# Patient Record
Sex: MALE | Race: WHITE | NOT HISPANIC OR LATINO | Employment: OTHER | URBAN - METROPOLITAN AREA
[De-identification: names, ages, dates, MRNs, and addresses within clinical notes are randomized per-mention and may not be internally consistent; named-entity substitution may affect disease eponyms.]

---
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
## 2018-01-08 ENCOUNTER — OFFICE VISIT - RIVER FALLS (OUTPATIENT)
Dept: FAMILY MEDICINE | Facility: CLINIC | Age: 18
End: 2018-01-08

## 2018-01-08 ASSESSMENT — MIFFLIN-ST. JEOR: SCORE: 2410.01

## 2018-10-28 ENCOUNTER — OFFICE VISIT - RIVER FALLS (OUTPATIENT)
Dept: FAMILY MEDICINE | Facility: CLINIC | Age: 18
End: 2018-10-28

## 2018-10-28 ASSESSMENT — MIFFLIN-ST. JEOR: SCORE: 2373.72

## 2019-05-21 ENCOUNTER — OFFICE VISIT - RIVER FALLS (OUTPATIENT)
Dept: FAMILY MEDICINE | Facility: CLINIC | Age: 19
End: 2019-05-21

## 2019-05-21 ASSESSMENT — MIFFLIN-ST. JEOR: SCORE: 2334.71

## 2020-06-23 ENCOUNTER — OFFICE VISIT - RIVER FALLS (OUTPATIENT)
Dept: FAMILY MEDICINE | Facility: CLINIC | Age: 20
End: 2020-06-23

## 2020-06-29 ENCOUNTER — COMMUNICATION - RIVER FALLS (OUTPATIENT)
Dept: FAMILY MEDICINE | Facility: CLINIC | Age: 20
End: 2020-06-29

## 2020-07-16 ENCOUNTER — OFFICE VISIT - RIVER FALLS (OUTPATIENT)
Dept: FAMILY MEDICINE | Facility: CLINIC | Age: 20
End: 2020-07-16

## 2020-07-17 ENCOUNTER — AMBULATORY - RIVER FALLS (OUTPATIENT)
Dept: FAMILY MEDICINE | Facility: CLINIC | Age: 20
End: 2020-07-17

## 2020-07-31 LAB — SARS-COV-2 RNA SPEC QL NAA+PROBE: NOT DETECTED

## 2020-10-30 ENCOUNTER — AMBULATORY - RIVER FALLS (OUTPATIENT)
Dept: FAMILY MEDICINE | Facility: CLINIC | Age: 20
End: 2020-10-30

## 2020-10-30 ENCOUNTER — OFFICE VISIT - RIVER FALLS (OUTPATIENT)
Dept: FAMILY MEDICINE | Facility: CLINIC | Age: 20
End: 2020-10-30

## 2020-10-30 ENCOUNTER — COMMUNICATION - RIVER FALLS (OUTPATIENT)
Dept: FAMILY MEDICINE | Facility: CLINIC | Age: 20
End: 2020-10-30

## 2021-01-13 ENCOUNTER — OFFICE VISIT - RIVER FALLS (OUTPATIENT)
Dept: FAMILY MEDICINE | Facility: CLINIC | Age: 21
End: 2021-01-13

## 2021-02-03 ENCOUNTER — AMBULATORY - RIVER FALLS (OUTPATIENT)
Dept: FAMILY MEDICINE | Facility: CLINIC | Age: 21
End: 2021-02-03

## 2021-02-04 ENCOUNTER — COMMUNICATION - RIVER FALLS (OUTPATIENT)
Dept: FAMILY MEDICINE | Facility: CLINIC | Age: 21
End: 2021-02-04

## 2021-02-04 LAB
Lab: NEGATIVE
SARS-COV-2 AB PNL SERPL IA: NEGATIVE

## 2021-02-08 ENCOUNTER — OFFICE VISIT - RIVER FALLS (OUTPATIENT)
Dept: FAMILY MEDICINE | Facility: CLINIC | Age: 21
End: 2021-02-08

## 2021-02-08 ASSESSMENT — MIFFLIN-ST. JEOR: SCORE: 1592.63

## 2021-04-28 ENCOUNTER — OFFICE VISIT - RIVER FALLS (OUTPATIENT)
Dept: FAMILY MEDICINE | Facility: CLINIC | Age: 21
End: 2021-04-28

## 2021-04-28 ASSESSMENT — MIFFLIN-ST. JEOR: SCORE: 2363.74

## 2021-12-23 ENCOUNTER — OFFICE VISIT - RIVER FALLS (OUTPATIENT)
Dept: FAMILY MEDICINE | Facility: CLINIC | Age: 21
End: 2021-12-23

## 2021-12-23 ASSESSMENT — MIFFLIN-ST. JEOR: SCORE: 2408.65

## 2022-01-13 ENCOUNTER — OFFICE VISIT - RIVER FALLS (OUTPATIENT)
Dept: FAMILY MEDICINE | Facility: CLINIC | Age: 22
End: 2022-01-13

## 2022-02-12 VITALS
BODY MASS INDEX: 49.35 KG/M2 | DIASTOLIC BLOOD PRESSURE: 72 MMHG | HEIGHT: 67 IN | SYSTOLIC BLOOD PRESSURE: 114 MMHG | WEIGHT: 314.4 LBS | HEART RATE: 64 BPM

## 2022-02-12 VITALS
DIASTOLIC BLOOD PRESSURE: 82 MMHG | SYSTOLIC BLOOD PRESSURE: 122 MMHG | BODY MASS INDEX: 48.65 KG/M2 | HEART RATE: 84 BPM | HEIGHT: 67 IN | WEIGHT: 310 LBS

## 2022-02-12 VITALS
HEIGHT: 67 IN | TEMPERATURE: 98.2 F | BODY MASS INDEX: 21.97 KG/M2 | DIASTOLIC BLOOD PRESSURE: 47 MMHG | WEIGHT: 140 LBS | WEIGHT: 307.8 LBS | DIASTOLIC BLOOD PRESSURE: 68 MMHG | OXYGEN SATURATION: 99 % | SYSTOLIC BLOOD PRESSURE: 91 MMHG | BODY MASS INDEX: 47.85 KG/M2 | HEART RATE: 86 BPM | SYSTOLIC BLOOD PRESSURE: 126 MMHG | HEART RATE: 64 BPM

## 2022-02-12 VITALS
TEMPERATURE: 98.3 F | OXYGEN SATURATION: 97 % | HEIGHT: 67 IN | HEART RATE: 78 BPM | BODY MASS INDEX: 49.44 KG/M2 | SYSTOLIC BLOOD PRESSURE: 122 MMHG | WEIGHT: 315 LBS | DIASTOLIC BLOOD PRESSURE: 76 MMHG

## 2022-02-12 VITALS
BODY MASS INDEX: 49.99 KG/M2 | DIASTOLIC BLOOD PRESSURE: 76 MMHG | WEIGHT: 314.4 LBS | SYSTOLIC BLOOD PRESSURE: 110 MMHG | HEART RATE: 80 BPM

## 2022-02-12 VITALS
HEIGHT: 67 IN | HEART RATE: 106 BPM | SYSTOLIC BLOOD PRESSURE: 118 MMHG | OXYGEN SATURATION: 97 % | BODY MASS INDEX: 49.44 KG/M2 | DIASTOLIC BLOOD PRESSURE: 76 MMHG | WEIGHT: 315 LBS

## 2022-02-12 VITALS
HEIGHT: 67 IN | WEIGHT: 312.2 LBS | TEMPERATURE: 97.7 F | SYSTOLIC BLOOD PRESSURE: 116 MMHG | HEART RATE: 90 BPM | OXYGEN SATURATION: 97 % | BODY MASS INDEX: 49 KG/M2 | DIASTOLIC BLOOD PRESSURE: 78 MMHG

## 2022-02-12 VITALS
HEART RATE: 88 BPM | WEIGHT: 312 LBS | DIASTOLIC BLOOD PRESSURE: 78 MMHG | BODY MASS INDEX: 49.44 KG/M2 | SYSTOLIC BLOOD PRESSURE: 128 MMHG | SYSTOLIC BLOOD PRESSURE: 114 MMHG | RESPIRATION RATE: 20 BRPM | HEIGHT: 67 IN | HEIGHT: 67 IN | TEMPERATURE: 98.6 F | HEART RATE: 100 BPM | DIASTOLIC BLOOD PRESSURE: 80 MMHG | WEIGHT: 315 LBS | BODY MASS INDEX: 48.97 KG/M2 | TEMPERATURE: 99.3 F

## 2022-02-12 VITALS
WEIGHT: 303.6 LBS | OXYGEN SATURATION: 98 % | SYSTOLIC BLOOD PRESSURE: 120 MMHG | DIASTOLIC BLOOD PRESSURE: 82 MMHG | BODY MASS INDEX: 47.65 KG/M2 | HEART RATE: 80 BPM | HEIGHT: 67 IN

## 2022-02-15 NOTE — TELEPHONE ENCOUNTER
---------------------  From: Cecilia Combs (Phone Messages Pool ([id number]_[facility]))   Sent: 7/31/2020 2:49:33 PM CDT  Subject: covid results      Phone Message    YELITZA BARTON    Time of Call: 1328    Phone Number: [phone]    Returned call at: 1445    Note: Mom called stating that pt is needing covid results as they have not heard back yet. Mom also wondering what the whole family needs to do being that they all quarantined already for 14 days and they are all sx free.    Called pt at number listed in his chart, and told him that mom called us and let message about getting covid results but told pt we cannot release info to her being pt is over the age of 16.     Notified pt that results are not back yet and that every needs to quarantine until results are back. Patient understood.

## 2022-02-15 NOTE — PROGRESS NOTES
Chief Complaint    c/o left knee pain since last week, getting worse, pain when bending or using stairs  History of Present Illness       Hurt left knee but unsure when. Hurts when bending or using stairs. Fell two weeks ago on elbow and does not remember hurting knee when fell. No pain at rest. Denies pain walking on flat surface.  Review of Systems       No numbness or tingling in legs       No weakness in legs  Physical Exam   Vitals & Measurements    T: 98.3  F (Tympanic)  HR: 78 (Peripheral)  BP: 122/76  SpO2: 97%     HT: 67.25 in  WT: 319.9 lb  BMI: 49.73        General: No acute distress       Musculoskeletal: Normal gait.  Good strength and range of motion lower extremities.  Left knee with negative Lachman's, anterior drawer, varus and valgus stress and Sunil's.  No joint effusion       Skin: No bruising or swelling around the left knee  Assessment/Plan       Left knee pain: Likely patellofemoral pain syndrome.  Treat with Naprosyn with food for 2 weeks.  Discussed muscle strengthening exercises and handout given. Follow up if not improving. Declines physical therapy for now.  Patient Information     Name:DINO ARORA      Address:      [address] [place] [id number]     Sex:Male     YOB: 2000     [phone]     Emergency Contact:DECLINED, UNKNOWN     MRN:[id number]     FIN:[id number]     Location:[facility]     Date of Service:12/23/2021      Primary Care Physician:       Ramirez Cintron MD, [phone]      Attending Physician:       Maurice Garcia MD, [phone]  Problem List/Past Medical History    Ongoing     Acute medial meniscus tear of right knee     Allergic rhinitis     HAN (generalized anxiety disorder)     Insomnia     Migraine     Moderate persistent asthma     Obesity     Vitamin D Deficiency    Historical     No qualifying data  Procedure/Surgical History     Ganglion cyst (05/23/2000)      Comments: excision rt wrist.  Medications     citalopram 20 mg oral tablet, 40 mg= 2 tab(s), Oral, daily, 5 refills    cyclobenzaprine 10 mg oral tablet, See Instructions, 5 refills    fluticasone 50 mcg/inh nasal spray, 2 spray(s), Nasal, daily, 1 refills    ibuprofen 200 mg oral capsule, 400 mg= 2 cap(s), Oral, q6 hrs, PRN    melatonin 3 mg oral tablet, 3 mg= 1 tab(s), Oral, PRN    topiramate 50 mg oral tablet, 100 mg= 2 tab(s), Oral, daily, 5 refills    ZyrTEC 10 mg oral tablet, 10 mg= 1 tab(s), Oral, daily  Allergies    No Known Medication Allergies

## 2022-02-15 NOTE — TELEPHONE ENCOUNTER
---------------------  From: Anjana Carlisle CMA   Sent: 7/17/2020 4:02:54 PM CDT  Subject: curbside testing     Pt had curbside testing done at the clinic today for PUI for COVID-19. 02 Sat = 95. Specimen sent to [facility] lab. Faxed forms to [facility].

## 2022-02-15 NOTE — NURSING NOTE
Comprehensive Intake Entered On:  6/23/2020 2:51 PM CDT    Performed On:  6/23/2020 2:44 PM CDT by Mary Lou Graves CMA               Summary   Chief Complaint :   Video visit left calf pain x on and off the past month. Verbal consent for video visit.   Menstrual Status :   N/A   Pain Present :   Yes actual or suspected pain   Intensity :   5    Primary Pain Comments :   Keeps him up every night the last week.   Primary Pain Location :   Calf   Mary Lou Graves CMA - 6/23/2020 2:44 PM CDT   Health Status   Allergies Verified? :   Yes   Medication History Verified? :   Yes   Immunizations Current :   Yes   Tobacco Use? :   Never smoker   Mary Lou Graves CMA - 6/23/2020 2:44 PM CDT   Consents   Consent for Immunization Exchange :   Consent Granted   Consent for Immunizations to Providers :   Consent Granted   Mary Lou Graves CMA - 6/23/2020 2:44 PM CDT   Meds / Allergies   (As Of: 6/23/2020 2:51:05 PM CDT)   Allergies (Active)   No Known Medication Allergies  Estimated Onset Date:   Unspecified ; Created By:   Kiko Layne CMA; Reaction Status:   Active ; Category:   Drug ; Substance:   No Known Medication Allergies ; Type:   Allergy ; Updated By:   Kiko Layne CMA; Reviewed Date:   6/23/2020 2:48 PM CDT        Medication List   (As Of: 6/23/2020 2:51:05 PM CDT)   Prescription/Discharge Order    citalopram  :   citalopram ; Status:   Prescribed ; Ordered As Mnemonic:   citalopram 20 mg oral tablet ; Simple Display Line:   40 mg, 2 tab(s), po, daily, 60 tab(s), 5 Refill(s) ; Ordering Provider:   Ramirez Cintron MD; Catalog Code:   citalopram ; Order Dt/Tm:   1/8/2018 2:58:22 PM CST          cyclobenzaprine  :   cyclobenzaprine ; Status:   Prescribed ; Ordered As Mnemonic:   cyclobenzaprine 10 mg oral tablet ; Simple Display Line:   See Instructions, TAKE ONE TABLET BY MOUTH EVERY 6 HOURS AS NEEDED FOR SPASM, 30 tab(s), 5 Refill(s) ; Ordering Provider:   Ramirez Cintron MD; Catalog Code:   cyclobenzaprine ;  Order Dt/Tm:   6/27/2019 12:57:21 PM CDT          fluticasone nasal  :   fluticasone nasal ; Status:   Prescribed ; Ordered As Mnemonic:   fluticasone 50 mcg/inh nasal spray ; Simple Display Line:   2 spray(s), Nasal, daily, 16 gm, 5 Refill(s) ; Ordering Provider:   Ramirez Cintron MD; Catalog Code:   fluticasone nasal ; Order Dt/Tm:   5/21/2019 10:23:14 AM CDT          topiramate  :   topiramate ; Status:   Prescribed ; Ordered As Mnemonic:   topiramate 50 mg oral tablet ; Simple Display Line:   100 mg, 2 tab(s), po, daily, 60 tab(s), 5 Refill(s) ; Ordering Provider:   Ramirez Cintron MD; Catalog Code:   topiramate ; Order Dt/Tm:   1/8/2018 2:58:36 PM [facility]            Home Meds    cholecalciferol  :   cholecalciferol ; Status:   Documented ; Ordered As Mnemonic:   Vitamin D3 2000 intl units oral capsule ; Simple Display Line:   2,000 International Unit, 1 cap(s), PO, daily ; Catalog Code:   cholecalciferol ; Order Dt/Tm:   7/26/2012 1:23:06 PM CDT          ibuprofen  :   ibuprofen ; Status:   Documented ; Ordered As Mnemonic:   ibuprofen 200 mg oral capsule ; Simple Display Line:   400 mg, 2 cap(s), Oral, q6 hrs, PRN: as needed for pain, 0 Refill(s) ; Catalog Code:   ibuprofen ; Order Dt/Tm:   6/23/2020 2:49:29 PM CDT          melatonin  :   melatonin ; Status:   Documented ; Ordered As Mnemonic:   melatonin 3 mg oral tablet ; Simple Display Line:   3 mg, 1 tab(s), po, tab(s), PRN: as needed for insomnia ; Catalog Code:   melatonin ; Order Dt/Tm:   5/20/2011 3:01:30 PM CDT            ID Risk Screen   Recent Travel History :   No recent travel   Family Member Travel History :   No recent travel   Other Exposure to Infectious Disease :   Unknown   Mary Lou Graves CMA - 6/23/2020 2:44 PM CDT

## 2022-02-15 NOTE — TELEPHONE ENCOUNTER
---------------------  From: Ayse Powers   Sent: 10/30/2020 3:30:15 PM CDT  Subject: Curbside Testing     Patient was in for curbside testing.   Per [facility]           O2 sat= 98%  Specimen sent to [facility] lab.     Forms faxed to [facility].     Priority# 0

## 2022-02-15 NOTE — NURSING NOTE
Comprehensive Intake Entered On:  4/28/2021 2:40 PM CDT    Performed On:  4/28/2021 2:34 PM CDT by Ted Wayne LPN               Summary   Chief Complaint :   Back Pain - 2 weeks, no known injury   Menstrual Status :   N/A   Weight Measured :   310 lb(Converted to: 310 lb 0 oz, 140.614 kg)    Height Measured :   67.25 in(Converted to: 5 ft 7 in, 170.81 cm)    Body Mass Index :   48.19 kg/m2 (HI)    Body Surface Area :   2.58 m2   Systolic Blood Pressure :   122 mmHg   Diastolic Blood Pressure :   82 mmHg (HI)    Mean Arterial Pressure :   95 mmHg   Peripheral Pulse Rate :   84 bpm   BP Site :   Right arm   Pulse Site :   Radial artery   BP Method :   Manual   HR Method :   Manual   Ted Wayne LPN - 4/28/2021 2:34 PM CDT   Consents   Consent for Immunization Exchange :   Consent Granted   Consent for Immunizations to Providers :   Consent Granted   Ted Wayne LPN - 4/28/2021 2:34 PM CDT   Meds / Allergies   (As Of: 4/28/2021 2:40:51 PM CDT)   Allergies (Active)   No Known Medication Allergies  Estimated Onset Date:   Unspecified ; Created By:   Kiko Layne CMA; Reaction Status:   Active ; Category:   Drug ; Substance:   No Known Medication Allergies ; Type:   Allergy ; Updated By:   Kiko Layne CMA; Reviewed Date:   6/23/2020 2:48 PM CDT        Medication List   (As Of: 4/28/2021 2:40:51 PM CDT)   Prescription/Discharge Order    albuterol  :   albuterol ; Status:   Prescribed ; Ordered As Mnemonic:   albuterol 90 mcg/inh inhalation aerosol ; Simple Display Line:   2 puff(s), Inhale, q4-6 hrs, PRN: for cough, 1 EA, 1 Refill(s) ; Ordering Provider:   Zachary Lunsford MD; Catalog Code:   albuterol ; Order Dt/Tm:   2/8/2021 2:33:05 PM CST          citalopram  :   citalopram ; Status:   Prescribed ; Ordered As Mnemonic:   citalopram 20 mg oral tablet ; Simple Display Line:   40 mg, 2 tab(s), po, daily, 60 tab(s), 5 Refill(s) ; Ordering Provider:   Ramirez Cintron MD; Catalog Code:   citalopram  ; Order Dt/Tm:   1/8/2018 2:58:22 PM CST          cyclobenzaprine  :   cyclobenzaprine ; Status:   Prescribed ; Ordered As Mnemonic:   cyclobenzaprine 10 mg oral tablet ; Simple Display Line:   See Instructions, TAKE ONE TABLET BY MOUTH EVERY 6 HOURS AS NEEDED FOR SPASM, 30 tab(s), 5 Refill(s) ; Ordering Provider:   Ramirez Cintron MD; Catalog Code:   cyclobenzaprine ; Order Dt/Tm:   6/27/2019 12:57:21 PM CDT          fluticasone nasal  :   fluticasone nasal ; Status:   Prescribed ; Ordered As Mnemonic:   fluticasone 50 mcg/inh nasal spray ; Simple Display Line:   2 spray(s), Nasal, daily, 16 gm, 1 Refill(s) ; Ordering Provider:   Ramirez Cintron MD; Catalog Code:   fluticasone nasal ; Order Dt/Tm:   11/24/2020 10:18:41 AM CST          topiramate  :   topiramate ; Status:   Prescribed ; Ordered As Mnemonic:   topiramate 50 mg oral tablet ; Simple Display Line:   100 mg, 2 tab(s), po, daily, 60 tab(s), 5 Refill(s) ; Ordering Provider:   Ramirez Cintron MD; Catalog Code:   topiramate ; Order Dt/Tm:   1/8/2018 2:58:36 PM CST            Home Meds    cholecalciferol  :   cholecalciferol ; Status:   Documented ; Ordered As Mnemonic:   Vitamin D3 2000 intl units oral capsule ; Simple Display Line:   2,000 International Unit, 1 cap(s), PO, daily ; Catalog Code:   cholecalciferol ; Order Dt/Tm:   7/26/2012 1:23:06 PM CDT          ibuprofen  :   ibuprofen ; Status:   Documented ; Ordered As Mnemonic:   ibuprofen 200 mg oral capsule ; Simple Display Line:   400 mg, 2 cap(s), Oral, q6 hrs, PRN: as needed for pain, 0 Refill(s) ; Catalog Code:   ibuprofen ; Order Dt/Tm:   6/23/2020 2:49:29 PM CDT          melatonin  :   melatonin ; Status:   Documented ; Ordered As Mnemonic:   melatonin 3 mg oral tablet ; Simple Display Line:   3 mg, 1 tab(s), po, tab(s), PRN: as needed for insomnia ; Catalog Code:   melatonin ; Order Dt/Tm:   5/20/2011 3:01:30 PM CDT            ID Risk Screen   Recent Travel History :   No recent travel   Family  Member Travel History :   No recent travel   Other Exposure to Infectious Disease :   Unknown   COVID-19 Testing Status :   No positive COVID-19 test   Ted Wayne LPN - 4/28/2021 2:34 PM CDT   Social History   Social History   (As Of: 4/28/2021 2:40:51 PM CDT)   Alcohol:  Denies Alcohol Use      (Last Updated: 4/19/2010 4:03:19 PM CDT by Elizabeth Farah CMA )         Tobacco:        Never (less than 100 in lifetime)   (Last Updated: 1/13/2021 4:10:29 PM CST by Beatris Babin)          Electronic Cigarette/Vaping:        Electronic Cigarette Use: Never.   (Last Updated: 1/13/2021 4:10:33 PM CST by Beatris Babin)          Employment/School:        Student   (Last Updated: 3/21/2012 12:50:20 PM CDT by Kym Camp)          Exercise:  Occasional exercise      (Last Updated: 4/19/2010 4:03:30 PM CDT by Elizabeth Farah CMA )

## 2022-02-15 NOTE — NURSING NOTE
"Comprehensive Intake Entered On:  5/21/2019 10:14 AM CDT    Performed On:  5/21/2019 10:09 AM CDT by Florida Burton CMA               Summary   Chief Complaint :   Pt here for \"clicking\"   right knee and nose discomfort   Menstrual Status :   N/A   Weight Measured :   303.6 lb(Converted to: 303 lb 10 oz, 137.71 kg)    Height Measured :   67.25 in(Converted to: 5 ft 7 in, 170.81 cm)    Body Mass Index :   47.19 kg/m2 (HI)    Body Surface Area :   2.55 m2   Systolic Blood Pressure :   120 mmHg   Diastolic Blood Pressure :   82 mmHg (HI)    Mean Arterial Pressure :   95 mmHg   Peripheral Pulse Rate :   80 bpm   Oxygen Saturation :   98 %   Florida Burton CMA - 5/21/2019 10:09 AM CDT   Health Status   Allergies Verified? :   Yes   Medication History Verified? :   Yes   Immunizations Current :   Yes   Medical History Verified? :   Yes   Pre-Visit Planning Status :   Completed   Tobacco Use? :   Never smoker   Florida Burton CMA - 5/21/2019 10:09 AM CDT   Consents   Consent for Immunization Exchange :   Consent Granted   Consent for Immunizations to Providers :   Consent Granted   Florida Burton CMA - 5/21/2019 10:09 AM CDT   Meds / Allergies   (As Of: 5/21/2019 10:14:19 AM CDT)   Allergies (Active)   No Known Medication Allergies  Estimated Onset Date:   Unspecified ; Created By:   Kiko Layne CMA; Reaction Status:   Active ; Category:   Drug ; Substance:   No Known Medication Allergies ; Type:   Allergy ; Updated By:   Kiko Layne CMA; Reviewed Date:   5/21/2019 10:13 AM CDT        Medication List   (As Of: 5/21/2019 10:14:19 AM CDT)   Prescription/Discharge Order    citalopram  :   citalopram ; Status:   Prescribed ; Ordered As Mnemonic:   citalopram 20 mg oral tablet ; Simple Display Line:   40 mg, 2 tab(s), po, daily, 60 tab(s), 5 Refill(s) ; Ordering Provider:   Ramirez Cintron MD; Catalog Code:   citalopram ; Order Dt/Tm:   1/8/2018 2:58:22 PM          cyclobenzaprine  :   cyclobenzaprine ; " Status:   Prescribed ; Ordered As Mnemonic:   cyclobenzaprine 10 mg oral tablet ; Simple Display Line:   See Instructions, TAKE ONE TABLET BY MOUTH EVERY 6 HOURS AS NEEDED FOR SPASM, 30 tab(s), 5 Refill(s) ; Ordering Provider:   Ramirez Cintron MD; Catalog Code:   cyclobenzaprine ; Order Dt/Tm:   1/8/2018 2:57:43 PM          topiramate  :   topiramate ; Status:   Prescribed ; Ordered As Mnemonic:   topiramate 50 mg oral tablet ; Simple Display Line:   100 mg, 2 tab(s), po, daily, 60 tab(s), 5 Refill(s) ; Ordering Provider:   Ramirez Cintron MD; Catalog Code:   topiramate ; Order Dt/Tm:   1/8/2018 2:58:36 PM            Home Meds    cholecalciferol  :   cholecalciferol ; Status:   Documented ; Ordered As Mnemonic:   Vitamin D3 2000 intl units oral capsule ; Simple Display Line:   2,000 International Unit, 1 cap(s), PO, daily ; Catalog Code:   cholecalciferol ; Order Dt/Tm:   7/26/2012 1:23:06 PM          melatonin  :   melatonin ; Status:   Documented ; Ordered As Mnemonic:   melatonin 3 mg oral tablet ; Simple Display Line:   3 mg, 1 tab(s), po, tab(s), PRN: as needed for insomnia ; Catalog Code:   melatonin ; Order Dt/Tm:   5/20/2011 3:01:30 PM

## 2022-02-15 NOTE — PROGRESS NOTES
Patient:   DINO ARORA            MRN: [id number]            FIN: [id number]               Age:   20 years     Sex:  Male     :  2000   Associated Diagnoses:   Animal bite   Author:   Roland Horne MD      Visit Information      Date of Service: 2021 04:08 pm  Performing Location: [facility]  Encounter#: [id number]      Chief Complaint   2021 4:09 PM CST    Animal bite, laceration to back of right leg.        History of Present Illness   Patient is in with a Pig but [place].  No other complaints worse.         Review of Systems   Constitutional:  Negative except as documented in history of present illness.    Musculoskeletal:  Negative.    Integumentary:  Negative except as documented in history of present illness.    Neurologic:  Negative.       Physical Examination   Vital Signs   2021 4:09 PM CST Temperature Tympanic 98.2 DegF    Peripheral Pulse Rate 86 bpm    HR Method Electronic    Systolic Blood Pressure 91 mmHg    Diastolic Blood Pressure 47 mmHg  LOW    Mean Arterial Pressure 62 mmHg    BP Method Electronic    Vital Signs Comments thigh cuff used      Measurements from flowsheet : Measurements   2021 4:09 PM CST    Weight Measured - Standard                307.8 lb     General:  Alert and oriented, No acute distress.    Integumentary:  Patient has a one 1-1/2 cm slightly open wound in his posterior right leg with a 4 cm scratch below that.  CMS otherwise intact for the leg wound was sterilely cleansed and then dressed with antibiotic ointment and gauze  .    Neurologic:  Alert, Oriented.       Impression and Plan   Diagnosis     Animal bite ([id number] T14.8XXA).     Plan:  Patient with animal bite tetanus status was updated today.  Wound care reviewed.  Given the small open area the wound will look at wound care allowing to heal by secondary granulation follow-up for any signs of infection will cover with  Augmentin  .

## 2022-02-15 NOTE — NURSING NOTE
Comprehensive Intake Entered On:  2/8/2021 2:02 PM CST    Performed On:  2/8/2021 1:58 PM CST by Lisset Nguyen CMA               Summary   Chief Complaint :   c/o chest congestion off and on since Jan.   Menstrual Status :   N/A   Weight Measured :   140.0 lb(Converted to: 140 lb 0 oz, 63.503 kg)    Height Measured :   67.25 in(Converted to: 5 ft 7 in, 170.81 cm)    Body Mass Index :   21.76 kg/m2   Body Surface Area :   1.73 m2   Systolic Blood Pressure :   126 mmHg   Diastolic Blood Pressure :   68 mmHg   Mean Arterial Pressure :   87 mmHg   Peripheral Pulse Rate :   64 bpm   Oxygen Saturation :   99 %   Lisset Nguyen CMA - 2/8/2021 1:58 PM CST   Health Status   Allergies Verified? :   Yes   Medication History Verified? :   Yes   Immunizations Current :   Yes   Pre-Visit Planning Status :   Completed   Lisset Nguyen CMA - 2/8/2021 1:58 PM CST   Consents   Consent for Immunization Exchange :   Consent Granted   Consent for Immunizations to Providers :   Consent Granted   Lisset Nguyen CMA - 2/8/2021 1:58 PM CST   Meds / Allergies   (As Of: 2/8/2021 2:02:40 PM CST)   Allergies (Active)   No Known Medication Allergies  Estimated Onset Date:   Unspecified ; Created By:   Kiko Layne CMA; Reaction Status:   Active ; Category:   Drug ; Substance:   No Known Medication Allergies ; Type:   Allergy ; Updated By:   Kiko Layne CMA; Reviewed Date:   6/23/2020 2:48 PM CDT        Medication List   (As Of: 2/8/2021 2:02:40 PM CST)   Prescription/Discharge Order    citalopram  :   citalopram ; Status:   Prescribed ; Ordered As Mnemonic:   citalopram 20 mg oral tablet ; Simple Display Line:   40 mg, 2 tab(s), po, daily, 60 tab(s), 5 Refill(s) ; Ordering Provider:   Ramirez Cintron MD; Catalog Code:   citalopram ; Order Dt/Tm:   1/8/2018 2:58:22 PM CST          cyclobenzaprine  :   cyclobenzaprine ; Status:   Prescribed ; Ordered As Mnemonic:   cyclobenzaprine 10 mg oral tablet ; Simple  Display Line:   See Instructions, TAKE ONE TABLET BY MOUTH EVERY 6 HOURS AS NEEDED FOR SPASM, 30 tab(s), 5 Refill(s) ; Ordering Provider:   Ramirez Cintron MD; Catalog Code:   cyclobenzaprine ; Order Dt/Tm:   6/27/2019 12:57:21 PM CDT          fluticasone nasal  :   fluticasone nasal ; Status:   Prescribed ; Ordered As Mnemonic:   fluticasone 50 mcg/inh nasal spray ; Simple Display Line:   2 spray(s), Nasal, daily, 16 gm, 1 Refill(s) ; Ordering Provider:   Ramirez Cintron MD; Catalog Code:   fluticasone nasal ; Order Dt/Tm:   11/24/2020 10:18:41 AM CST          topiramate  :   topiramate ; Status:   Prescribed ; Ordered As Mnemonic:   topiramate 50 mg oral tablet ; Simple Display Line:   100 mg, 2 tab(s), po, daily, 60 tab(s), 5 Refill(s) ; Ordering Provider:   Ramirez Cintron MD; Catalog Code:   topiramate ; Order Dt/Tm:   1/8/2018 2:58:36 PM CST            Home Meds    cholecalciferol  :   cholecalciferol ; Status:   Documented ; Ordered As Mnemonic:   Vitamin D3 2000 intl units oral capsule ; Simple Display Line:   2,000 International Unit, 1 cap(s), PO, daily ; Catalog Code:   cholecalciferol ; Order Dt/Tm:   7/26/2012 1:23:06 PM CDT          ibuprofen  :   ibuprofen ; Status:   Documented ; Ordered As Mnemonic:   ibuprofen 200 mg oral capsule ; Simple Display Line:   400 mg, 2 cap(s), Oral, q6 hrs, PRN: as needed for pain, 0 Refill(s) ; Catalog Code:   ibuprofen ; Order Dt/Tm:   6/23/2020 2:49:29 PM CDT          melatonin  :   melatonin ; Status:   Documented ; Ordered As Mnemonic:   melatonin 3 mg oral tablet ; Simple Display Line:   3 mg, 1 tab(s), po, tab(s), PRN: as needed for insomnia ; Catalog Code:   melatonin ; Order Dt/Tm:   5/20/2011 3:01:30 PM CDT            ID Risk Screen   Recent Travel History :   No recent travel   Family Member Travel History :   No recent travel   Other Exposure to Infectious Disease :   Unknown   COVID-19 Testing Status :   No positive COVID-19 test   Patrickmargarita CASTRO, Lisset -  2/8/2021 1:58 PM CST

## 2022-02-15 NOTE — NURSING NOTE
Comprehensive Intake Entered On:  7/16/2020 3:40 PM CDT    Performed On:  7/16/2020 3:38 PM CDT by Beatris Babin               Summary   Chief Complaint :   Concerns for COVID.  Was at a large gathering out of state ([place] 7/6-7/11.  Developed a headache on 7/14 and GI/diarrhea on 7/15.  Verbal consent for video visit given   Menstrual Status :   N/A   Beatris Babin - 7/16/2020 3:38 PM CDT   Health Status   Allergies Verified? :   Yes   Medication History Verified? :   Yes   Immunizations Current :   Yes   Baetris Babin - 7/16/2020 3:38 PM CDT   ID Risk Screen   Recent Travel History :   Last travel within 14 days   Recent Travel Location :   [place]   Family Member Travel History :   No recent travel   Other Exposure to Infectious Disease :   Unknown   Beatris Babin - 7/16/2020 3:38 PM CDT

## 2022-02-15 NOTE — TELEPHONE ENCOUNTER
---------------------  From: Cecilia Combs (Phone [facility] [place] ([id number]_[facility]))   To: [facility] Message Pool ([id number]_[facility])   ;     Sent: 6/30/2020 9:17:15 AM CDT  Subject: MRI Results      Phone Message    PCP: MICK    Time of Call: 1147    Phone Number: [phone]    Note: Naila- mom, called stating that she had an MRI done yesterday. Mom states that pt has been a lot of discomfort and pain over the last week. They are wanting [facility] to look at results and give them a call back so they can have a plan of what to do next. Please advise.     Pharmacy: [facility]     Last office visit and reason: 6/23/2020 Video Encounter     Transferred to: [facility] [place]---------------------  From: Florida Burton CMA ([place] ([id number]_[facility])   )   To: Ramirez Cintron MD;     Sent: 6/30/2020 9:29:53 AM CDT  Subject: FW: MRI Results

## 2022-02-15 NOTE — PROGRESS NOTES
Patient:   DINO ARORA            MRN: [id number]            FIN: [id number]               Age:   16 years     Sex:  Male     :  2000   Associated Diagnoses:   Right elbow pain   Author:   Zachary Lunsford MD      Subjective   Chief complaint 2017 3:36 PM CST    c/o right elbow pain off and on for a few weeks. Unsure of how or when he hurt it.  .     pain is in antecubital fossa and primarily with lifting or more vigourous activity.   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  no specific trauma or fall  no tingling'  no bruising      Health Status   Allergies:    Allergic Reactions (Selected)  No Known Medication Allergies   Medications:  (Selected)   Prescriptions  Prescribed  CeleXA 20 mg oral tablet: 2 tab(s) ( 40 mg ), po, daily, # 60 tab(s), 5 Refill(s), Type: Maintenance, Pharmacy: [id number], 2 tab(s) po daily  Medrol 4 mg oral tablet: 1 packet(s), PO, Once, Instructions: as directed on package labeling, # 21 tab(s), 0 Refill(s), Type: Soft Stop, Pharmacy: [id number], 1 packet(s) po once,Instr:as directed on package labeling  amitriptyline 25 mg oral tablet: 3 tab(s) ( 75 mg ), PO, Once a day (at bedtime), tab(s), 2 Refill(s), Type: Maintenance, Pharmacy: [id number]  amitriptyline 25 mg oral tablet: 3 tab(s) ( 75 mg ), po, hs, # 90 tab(s), 5 Refill(s), Pharmacy: [id number], 3 tab(s) po hs  cyclobenzaprine 10 mg oral tablet: See Instructions, Instructions: TAKE ONE TABLET BY MOUTH EVERY 6 HOURS AS NEEDED FOR SPASM, # 30 tab(s), 1 Refill(s), Type: Soft Stop, Pharmacy: [id number], TAKE ONE TABLET BY MOUTH EVERY 6 HOURS AS NEEDED FOR SPASM  topiramate 50 mg oral tablet: 2 tab(s) ( 100 mg ), po, daily, # 60 tab(s), 5 Refill(s), Type: Maintenance, Pharmacy: [id number], 2 tab(s) po daily  Documented Medications  Documented  Vitamin D3 2000 intl units oral capsule: 1 cap(s) ( 2,000 International Unit ), PO, daily, 0  Refill(s), Type: Soft Stop  melatonin 3 mg oral tablet: 1 tab(s) ( 3 mg ), po, PRN: as needed for insomnia, tab(s), 0 Refill(s), Type: Maintenance   Problem list:    All Problems  Obesity / 278.00 / Confirmed  Insomnia / 561526884 / Confirmed  Vitamin D Deficiency / 268.9 / Confirmed  Moderate persistent asthma / 0324397164 / Confirmed  Migraine / 96184035 / Confirmed      Objective   Vital Signs   2/20/2017 3:36 PM CST Peripheral Pulse Rate 64 bpm    Systolic Blood Pressure 114 mmHg    Diastolic Blood Pressure 72 mmHg    Mean Arterial Pressure 86 mmHg      Measurements from flowsheet : Measurements   2/20/2017 3:36 PM CST Height Measured - Standard 66.5 in    Weight Measured - Standard 314.4 lb    BSA 2.58 m2    Body Mass Index 49.98 kg/m2    Body Mass Index Percentile 99.92      General:  Alert and oriented, No acute distress.    Musculoskeletal:  Normal strength, No swelling, No deformity, tender with biceps activation.    Integumentary:  Warm.    Psychiatric:  Cooperative, Appropriate mood & affect.       Impression and Plan   Assessment and Plan:          Diagnosis: Right elbow pain (VOK17-OC M25.521).         Course: bicips tendonitis  reviewed appropriate activity  discussed stretching and strengthening  gave information about icing and heat  reviewed medication such as NSAIDs,         xray is normal radiology to over read,  discussed with patient.

## 2022-02-15 NOTE — PROGRESS NOTES
Patient:   DINO ARORA            MRN: [id number]            FIN: [id number]               Age:   16 years     Sex:  Male     :  2000   Associated Diagnoses:   HAN (generalized anxiety disorder); Insomnia; Migraine   Author:   Ramirez Cintron MD      Impression and Plan   Diagnosis     HAN (generalized anxiety disorder) (PLL32-XK F41.1).     Course:  Progressing as expected.    Orders     Orders   Charges (Evaluation and Management):  93776 office outpatient visit 25 minutes (Charge) (Order): Quantity: 1, Insomnia  Moderate persistent asthma  Migraine  HAN (generalized anxiety disorder).     Orders (Selected)   Prescriptions  Prescribed  CeleXA 20 mg oral tablet: 2 tab(s) ( 40 mg ), po, daily, # 60 tab(s), 5 Refill(s), Type: Maintenance, Pharmacy: [id number], 2 tab(s) po daily.     Diagnosis     Insomnia (OGO32-BE F51.01).     Course:  Progressing as expected.    Orders     Orders (Selected)   Prescriptions  Prescribed  amitriptyline 25 mg oral tablet: 3 tab(s) ( 75 mg ), po, hs, # 90 tab(s), 5 Refill(s), Pharmacy: [id number], 3 tab(s) po hs  Documented Medications  Documented  melatonin 3 mg oral tablet: 1 tab(s) ( 3 mg ), po, PRN: as needed for insomnia, tab(s), 0 Refill(s), Type: Maintenance.     Diagnosis     Migraine (UCG23-ML G43.009).     Course:  Progressing as expected.    Orders     Orders (Selected)   Prescriptions  Prescribed  topiramate 50 mg oral tablet: 2 tab(s) ( 100 mg ), po, daily, # 60 tab(s), 5 Refill(s), Type: Maintenance, Pharmacy: [id number], 2 tab(s) po daily.        Visit Information   Visit type:  Scheduled follow-up.    Accompanied by:  Mother.    Source of history:  Self, Mother.    Referral source:  Self.    History limitation:  None.       Chief Complaint   3/16/2017 10:39 AM CDT   Pt here for med ck        History of Present Illness             The patient presents with insomnia.  The severity of the insomnia is severe.  The insomnia is  constant.  The insomnia has lasted for several years.  There are no modifying factors.  Associated symptoms consist of fatigue.  Additional pertinent history: none.               The patient presents with anxiety.  The anxiety is characterized by difficulty concentrating, irritability, nervousness and restlessness.  The severity of the anxiety is moderate.  The anxiety is constant.  The context of the anxiety: occurred in association with the inability to cope with stress.  Exacerbating factors consist of emotional stress and social change.  Relieving factors consist of medication and stress reduction.        Interval History   Since the patient's last visit for a migraine:   The patient reports the migraine episodes have decreased.  There has been no effect on the patients daily activities.  Associated symptom(s) consist of sensitivity to light, visual disturbance, nausea, vomiting and no aura.     Review of Systems   Constitutional:  Negative.    Eye:  Negative.    Ear/Nose/Mouth/Throat:  Negative.    Respiratory:  Negative.    Cardiovascular:  Negative.    Gastrointestinal:  Negative.    Genitourinary:  Negative.    Hematology/Lymphatics:  Negative.    Endocrine:  Negative.    Immunologic:  Negative.    Musculoskeletal:  Negative.    Integumentary:  Negative.    Neurologic:  Negative.    Psychiatric:  Negative.    All other systems reviewed and negative      Health Status   Allergies:    Allergic Reactions (Selected)  No Known Medication Allergies   Medications:  (Selected)   Prescriptions  Prescribed  CeleXA 20 mg oral tablet: 2 tab(s) ( 40 mg ), po, daily, # 60 tab(s), 5 Refill(s), Type: Maintenance, Pharmacy: [id number], 2 tab(s) po daily  amitriptyline 25 mg oral tablet: 3 tab(s) ( 75 mg ), PO, Once a day (at bedtime), tab(s), 2 Refill(s), Type: Maintenance, Pharmacy: [id number]  amitriptyline 25 mg oral tablet: 3 tab(s) ( 75 mg ), po, hs, # 90 tab(s), 5 Refill(s), Pharmacy: [facility] #[id number], 3 tab(s) po hs  cyclobenzaprine 10 mg oral tablet: See Instructions, Instructions: TAKE ONE TABLET BY MOUTH EVERY 6 HOURS AS NEEDED FOR SPASM, # 30 tab(s), 2 Refill(s), Type: Soft Stop, Pharmacy: [id number], TAKE ONE TABLET BY MOUTH EVERY 6 HOURS AS NEEDED FOR SPASM  topiramate 50 mg oral tablet: 2 tab(s) ( 100 mg ), po, daily, # 60 tab(s), 5 Refill(s), Type: Maintenance, Pharmacy: [id number], 2 tab(s) po daily  Documented Medications  Documented  Vitamin D3 2000 intl units oral capsule: 1 cap(s) ( 2,000 International Unit ), PO, daily, 0 Refill(s), Type: Soft Stop  melatonin 3 mg oral tablet: 1 tab(s) ( 3 mg ), po, PRN: as needed for insomnia, tab(s), 0 Refill(s), Type: Maintenance   Problem list:    All Problems  HAN (generalized anxiety disorder) / SNOMED CT 74989620 / Confirmed  Insomnia / SNOMED CT 9368873513 / Confirmed  Migraine / SNOMED CT 0194790616 / Confirmed  Moderate persistent asthma / SNOMED CT 9483638961 / Confirmed  Obesity / ICD-9-.00 / Confirmed  Vitamin D Deficiency / ICD-9-.9 / Confirmed      Histories   Past Medical History:    Active  Obesity (278.00)  Insomnia (2118934432)   Family History:    Cancer  Grandmother (M)  Diabetes mellitus  Mother (Naila)  Grandmother (M)  Grandmother (P)  Hypertension  Grandfather (M)  Grandmother (M)  Heart disease  Grandmother (P)  Grandfather (P)     Procedure history:    Ganglion cyst (SNOMED CT 441270824) on 5/23/2000 at 22 Months.  Comments:  4/19/2010 3:59 PM - Farah , Elizabeth  excision rt wrist      Physical Examination   Vital Signs   3/16/2017 10:39 AM CDT Peripheral Pulse Rate 80 bpm    Pulse Site Radial artery    HR Method Manual    Systolic Blood Pressure 110 mmHg    Diastolic Blood Pressure 76 mmHg    Mean Arterial Pressure 87 mmHg    BP Site Right arm    BP Method Manual      Measurements from flowsheet : Measurements   3/16/2017 10:39 AM CDT   Weight Measured - Standard                314.4 lb      General:  No acute distress.    Neck:  Supple, No lymphadenopathy, No thyromegaly.    Respiratory:  Lungs are clear to auscultation, Respirations are non-labored, Breath sounds are equal, Symmetrical chest wall expansion.    Cardiovascular:  Normal rate, Regular rhythm, No murmur, No gallop, Good pulses equal in all extremities, Normal peripheral perfusion, No edema.    Gastrointestinal:  Soft, Non-tender, Non-distended, Normal bowel sounds, No organomegaly.    Integumentary:  Warm, Dry, Pink.    Neurologic:  Alert, Oriented.    Psychiatric:  Cooperative, Appropriate mood & affect, Non-suicidal.         Mood and affect: Calm.         Behavior: Relaxed.         Judgment: Able to make sensible decisions, Appropriate in social situations.         Thought process: Appropriate.

## 2022-02-15 NOTE — PROGRESS NOTES
"Chief Complaint    Pt consent to video vist. Would like to be test for COVID. Was an a trip 10/25 and a few friend have been positive this week. His symptoms are HA, cough, muscle aches.  History of Present Illness      Today's visit was conducted via video due to the COVID-19 pandemic. PT consent to video visit was obtained and documented       Call Start Time:   11:55 am      Call End Time:    11:59 am       Pt reports 6-7 days ago he traveled to [place] for a Pig show.  It was a mass gathering, large crowds.  He recieved an email from the coordinator of the event that there has been several positive individuals with known Positive covid 19 results from that large gathering. Pt denies any known close exposures.   Pt has had about 5 days of rhionrrhea, congestion, HA, fatigue.  He denies any severe sx of sob or difficulty breathing, nausea, vomiting, fevers or chills.  He works at a [employer] office and they are requiring testing for Covid 19 prior to return.   Review of Systems      Review of systems is negative with the exception of those noted in HPI          Physical Exam      nad appears well      able to talk in full sentances, no obvious tachypnea  Assessment/Plan       1. Acute URI (J06.9)         conservative measures.  Push fluids, rest and ibuprofen or tylenol for comfort.  recommend fu for persistent or worsening sx.  Covid 19 testing at drive up.  14 day quarentine from time of event 6 days ago, would be reasonable given it sounds as if it may have been a \"superspreader\" event.       2. Close exposure to 2019 novel coronavirus (Z20.828)  Patient Information     Name:DINO ARORA      Address:      [address] [place] [address] [address] [place] [phone]     Sex:Male     YOB: 2000     [phone]     Emergency Contact:DECLINED, UNKNOWN     MRN:[id number]     FIN:[id number]     Location:[facility]     Date of Service:10/30/2020      Primary Care Physician:       Abdulaziz LUCIANO, Ramirez, " [phone]      Attending Physician:       Thaddeus TRUJILLO, Vanessa JOAQUIN, [phone]  Problem List/Past Medical History    Ongoing     Acute medial meniscus tear of right knee     Allergic rhinitis     AHN (generalized anxiety disorder)     Insomnia     Migraine     Moderate persistent asthma     Obesity     Vitamin D Deficiency    Historical     No qualifying data  Procedure/Surgical History     Ganglion cyst (05/23/2000)            Comments: excision rt wrist.  Medications    citalopram 20 mg oral tablet, 40 mg= 2 tab(s), Oral, daily, 5 refills    cyclobenzaprine 10 mg oral tablet, See Instructions, 5 refills    fluticasone 50 mcg/inh nasal spray, 2 spray(s), Nasal, daily, 2 refills    ibuprofen 200 mg oral capsule, 400 mg= 2 cap(s), Oral, q6 hrs, PRN    melatonin 3 mg oral tablet, 3 mg= 1 tab(s), Oral, PRN    topiramate 50 mg oral tablet, 100 mg= 2 tab(s), Oral, daily, 5 refills    Vitamin D3 2000 intl units oral capsule, 2000 International Unit= 1 cap(s), Oral, daily  Allergies    No Known Medication Allergies  Social History    Smoking Status     Never smoker     Alcohol - Denies Alcohol Use     Employment/School      Student     Exercise - Occasional exercise     Tobacco - Denies Tobacco Use  Family History    Cancer: Grandmother (M).    Diabetes mellitus: Mother, Grandmother (M) and Grandmother (P).    Heart disease: Grandfather (P) and Grandmother (P).    Hypertension: Grandfather (M) and Grandmother (M).  Immunizations      Vaccine Date Status          influenza virus vaccine, inactivated 12/24/2015 Given              Comments : [12/24/2015] MOther          human papillomavirus vaccine 12/24/2015 Given              Comments : [12/24/2015] MOther          human papillomavirus vaccine 12/02/2014 Given          influenza virus vaccine, inactivated 12/02/2014 Given          human papillomavirus vaccine 08/19/2014 Given              Comments : [8/19/2014] L lower          meningococcal conjugate vaccine  08/19/2014 Given              Comments : [8/19/2014] L upper          influenza (LAIV) 10/08/2012 Given          influenza (LAIV) 10/20/2011 Given          Td 06/30/2009 Recorded          DTaP 07/02/2003 Recorded          IPV 07/02/2003 Recorded          MMR (measles/mumps/rubella) 07/02/2003 Recorded          Hib (HbOC) 10/05/1999 Recorded          DTaP 10/05/1999 Recorded          IPV 10/05/1999 Recorded          MMR (measles/mumps/rubella) 07/06/1999 Recorded          varicella 07/06/1999 Recorded          varicella 06/30/1999 Recorded          Hib (HbOC) 01/12/1999 Recorded          Hib (HbOC) 01/12/1999 Recorded          DTaP 01/12/1999 Recorded          Hep B 01/12/1999 Recorded          DTaP 1998 Recorded          IPV 1998 Recorded          Hib (HbOC) 1998 Recorded          DTaP 1998 Recorded          IPV 1998 Recorded          Hep B 1998 Recorded          Hep B 1998 Recorded

## 2022-02-15 NOTE — LETTER
(Inserted Image. Unable to display)   [address] [place] [id number]  February 04, 2021      DINO ARORA  [address] [place] [phone]        Dear DINO,     Thank you for selecting [facility] for your healthcare needs. Below you will find the results of the recent tests done at our clinic.      Antibody test is NEGATIVE - NO past history of COVID infection.      Result Name Current Result Reference Range   Coronavirus SARS-CoV-2 (COVID-19) Ab IgG  NEGATIVE 2/3/2021 NEGATIVE - NEGATIVE   Coronavirus SARS-CoV-2 (COVID-19) Ab IgM  NEGATIVE 2/3/2021 NEGATIVE - NEGATIVE       Please contact my practice at [phone] if you have any questions or concerns.     Sincerely,        Ramirez Cintron MD          What do your labs mean?  Below is a glossary of commonly ordered labs:  LDL   Bad Cholesterol   HDL   Good Cholesterol  AST/ALT   Liver Function   Cr/Creatinine   Kidney Function  Microalbumin   Kidney Function  BUN   Kidney Function  PSA   Prostate    TSH   Thyroid Hormone  HgbA1c   Diabetes Test   Hgb (Hemoglobin)   Red Blood Cells

## 2022-02-15 NOTE — NURSING NOTE
Pt has had 2 negative covid results on campus and would like antibody test done.  Per MICK ok for pt to have antibody test as he has had 2 negatives

## 2022-02-15 NOTE — PROGRESS NOTES
Chief Complaint    Here for left heel pain carrying up the left calf, pain since begging of this week  History of Present Illness      Chief complaint as above reviewed and confirmed with patient.  Pt presents to the clinic with concerns re: L heel pain that radiates to instep x 1 week.  pain worse with walking and going up and down stairs.  no trauma.  insidious onset.  notes calf to be sore as well.  no tingling or numbness.  no hx of similar.  Review of Systems      Review of systems is negative with the exception of those noted in HPI          Physical Exam   Vitals & Measurements    T: 97.7   F (Tympanic)  HR: 90(Peripheral)  BP: 116/78  SpO2: 97%     HT: 67.25 in  WT: 312.2 lb  BMI: 48.53       exam of the foot reveals no erythema or swelling.  Nontender to palpaiton of the forefoot but pain at the plantar surface of the foot at the insertion of the PF on the calcaneous as well as along the plantar arch.  there is full AROM at the ankle but pain noted with passive stretch of the calf in dorsiflexion with knee bent and extended.  no pain to palpaiton of the achilles tendon. calf soft and nontender.   sensation and peripheral pulses intact. cap refill brisk.  no laxity with anterior drawer or talar tilt.  Assessment/Plan       1. Plantar fasciitis (M72.2)         recommend ice, stretching, well fitting shoe wear.  referral to pt.  ibuprofen for pain .  discussed may take several weeks to improve.         Orders:          71008 office outpatient visit 15 minutes (Charge), Quantity: 1, Foot pain  Plantar fasciitis          Physical Therapy (Request), Priority: Soon, Instructions: eval and treat plantar fasciitis and calf stretching L, Foot pain  Plantar fasciitis                Foot pain (M79.673)         Orders:          42752 office outpatient visit 15 minutes (Charge), Quantity: 1, Foot pain  Plantar fasciitis          Physical Therapy (Request), Priority: Soon, Instructions: eval and treat plantar fasciitis  and calf stretching L, Foot pain  Plantar fasciitis           Patient Information     Name:DINO ARORA      Address:      [address] [place] [id number]     Sex:Male     YOB: 2000     [phone]     Emergency Contact:ROBLES, UNKNOWN     MRN:[id number]     FIN:[id number]     Location:[facility]     Date of Service:10/28/2018      Primary Care Physician:       Ramirez Cintron MD, [phone]      Attending Physician:       Vanessa Travis PA-C, [phone]  Problem List/Past Medical History    Ongoing     HAN (generalized anxiety disorder)     Insomnia     Migraine     Moderate persistent asthma     Obesity     Vitamin D Deficiency    Historical     No qualifying data  Procedure/Surgical History     Ganglion cyst (05/23/2000)            Comments:      excision rt wrist  Medications     melatonin 3 mg oral tablet: 3 mg, 1 tab(s), po, tab(s), PRN: as needed for insomnia.     Vitamin D3 2000 intl units oral capsule: 2,000 International Unit, 1 cap(s), PO, daily.     cyclobenzaprine 10 mg oral tablet: See Instructions, TAKE ONE TABLET BY MOUTH EVERY 6 HOURS AS NEEDED FOR SPASM, 30 tab(s), 5 Refill(s).     citalopram 20 mg oral tablet: 40 mg, 2 tab(s), po, daily, 60 tab(s), 5 Refill(s).     topiramate 50 mg oral tablet: 100 mg, 2 tab(s), po, daily, 60 tab(s), 5 Refill(s).          Allergies    No Known Medication Allergies  Social History    Smoking Status - 10/28/2018     Never smoker     Alcohol - Denies Alcohol Use, 04/19/2010     Employment and Education      Student, 03/21/2012     Exercise and Physical Activity - Occasional exercise, 04/19/2010     Tobacco - Denies Tobacco Use, 04/19/2010  Family History    Cancer: Grandmother (M).    Diabetes mellitus: Mother, Grandmother (M) and Grandmother (P).    Heart disease: Grandfather (P) and Grandmother (P).    Hypertension: Grandfather (M) and Grandmother (M).  Immunizations      Vaccine Date Status  Comments      influenza virus vaccine, inactivated 12/24/2015 Given [12/24/2015] MOther      human papillomavirus vaccine 12/24/2015 Given [12/24/2015] MOther      human papillomavirus vaccine 12/02/2014 Given      influenza virus vaccine, inactivated 12/02/2014 Given      human papillomavirus vaccine 08/19/2014 Given [8/19/2014] L lower      meningococcal conjugate vaccine 08/19/2014 Given [8/19/2014] L upper      influenza (LAIV) 10/08/2012 Given      influenza (LAIV) 10/20/2011 Given      Td 06/30/2009 Recorded      IPV 07/02/2003 Recorded      MMR (measles/mumps/rubella) 07/02/2003 Recorded      DTaP 07/02/2003 Recorded      IPV 10/05/1999 Recorded      Hib (HbOC) 10/05/1999 Recorded      DTaP 10/05/1999 Recorded      MMR (measles/mumps/rubella) 07/06/1999 Recorded      varicella 07/06/1999 Recorded      varicella 06/30/1999 Recorded      Hib (HbOC) 01/12/1999 Recorded      Hib (HbOC) 01/12/1999 Recorded      Hep B 01/12/1999 Recorded      DTaP 01/12/1999 Recorded      IPV 1998 Recorded      DTaP 1998 Recorded      IPV 1998 Recorded      Hib (HbOC) 1998 Recorded      Hep B 1998 Recorded      DTaP 1998 Recorded      Hep B 1998 Recorded

## 2022-02-15 NOTE — TELEPHONE ENCOUNTER
Entered by Elena Lowery CMA on November 24, 2020 10:19:01 AM CST  ---------------------  From: Elena Lowery CMA   To: [facility]    Sent: 11/24/2020 10:19:00 AM CST  Subject: Medication Management     ** Submitted: **  Order:fluticasone nasal (fluticasone 50 mcg/inh nasal spray)  2 spray(s)  Nasal  daily  Qty:  16 gm        Days Supply:  30        Refills:  1          Substitutions Allowed     Route To [facility]    Signed by Elena Lowery CMA  11/24/2020 4:18:00 PM [facility]    ** Submitted: **  Complete:fluticasone nasal (fluticasone 50 mcg/inh nasal spray)   Signed by Elena Lowery CMA  11/24/2020 4:18:00 PM [facility]    ** Not Approved:  **  fluticasone nasal (FLUTICASONE PROPIONATE 50 SUSP)  INHALE TWO SPRAYS INTO EACH NOSTRIL EVERY DAY  Qty:  16 gm        Days Supply:  30        Refills:  2          Substitutions Allowed     Route To [facility]   Signed by Elena Lowery CMA            ------------------------------------------  From: [facility]  To: Ramirez Cintron MD  Sent: November 23, 2020 9:06:41 AM CST  Subject: Medication Management  Due: November 21, 2020 12:37:27 PM CST     ** On Hold Pending Signature **     Drug: fluticasone nasal (fluticasone 50 mcg/inh nasal spray), INHALE TWO SPRAYS INTO EACH NOSTRIL EVERY DAY  Quantity: 16 gm  Days Supply: 30  Refills: 2  Substitutions Allowed  Notes from Pharmacy:     Dispensed Drug: fluticasone nasal (fluticasone 50 mcg/inh nasal spray), INHALE TWO SPRAYS INTO EACH NOSTRIL EVERY DAY  Quantity: 16 gm  Days Supply: 30  Refills: 2  Substitutions Allowed  Notes from Pharmacy:  ------------------------------------------

## 2022-02-15 NOTE — PROGRESS NOTES
Chief Complaint    c/o chest congestion off and on since Jan.  History of Present Illness       Patient here because of some chest congestion.       He has had to use inhalers before he has not been using one recently.  His mother had no symptoms prior antibody positive for Covid.  He has that 1 January had some cold symptoms and did test positive.  He has had weekly test since then that have been negative except for 1 rapid test that was positive but in the PCR was negative.  Through all this he has had occasional cough.  When he is more active he feels okay but when he sits sometimes there is a pain in his chest and to take some deep breaths he has not had fevers or chills.       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  I discussed this with patient.  I think he is having some bronchospasm certainly probably triggered by the cold weather we have had in him being outside a lot quite often.  He is here to try his inhaler and I think this will relieve his symptoms certainly if he gets worse or if symptoms just will not go away we will consider further evaluation.  I discussed with him Covid symptoms and how he can be mild to reassuring that all his testing has been negative but admittedly no test is perfect talked about the serum antibody test         Ordered:          albuterol, 2 puff(s), Inhale, q4-6 hrs, PRN: for cough, # 1 EA, 1 Refill(s), Type: Maintenance, Pharmacy: [facility] [place], 2  puff(s) Inhale q4-6 hrs,PRN:for cough, 67.25, in, 02/08/21 13:58:00 CST, Height Measured, 140, lb, 02/08/21 13:58:00..., (Ordered)          XR Chest PA/Lat (Request), Chest pain           Patient Information     Name:DINO ARORA      Address:      [address] [place] [address] [address] [place] [id number]     Sex:Male     YOB: 2000     [phone]     Emergency Contact:DECLINED, UNKNOWN     MRN:[id number]     FIN:[id number]     Location:[facility]     Date of Service:02/08/2021      Primary Care Physician:       Abdulaziz LUCIANO, Ramirez, [phone]      Attending Physician:       Isatu LUCIANO, Zachary, [phone]  Problem List/Past Medical History    Ongoing     Acute medial meniscus tear of right knee     Allergic rhinitis     HAN (generalized anxiety disorder)     Insomnia     Migraine     Moderate persistent asthma     Obesity     Vitamin D Deficiency    Historical     No qualifying data  Procedure/Surgical History     Ganglion cyst (05/23/2000)      Comments: excision rt wrist.  Medications    albuterol 90 mcg/inh inhalation aerosol, 2 puff(s), Inhale, q4-6 hrs, PRN, 1 refills    citalopram 20 mg oral tablet, 40 mg= 2 tab(s), Oral, daily, 5 refills    cyclobenzaprine 10 mg oral tablet, See Instructions, 5 refills    fluticasone 50 mcg/inh nasal spray, 2 spray(s), Nasal, daily, 1 refills    ibuprofen 200 mg oral capsule, 400 mg= 2 cap(s), Oral, q6 hrs, PRN    melatonin 3 mg oral tablet, 3 mg= 1 tab(s), Oral, PRN    topiramate 50 mg oral tablet, 100 mg= 2 tab(s), Oral, daily, 5 refills    Vitamin D3 2000 intl units oral capsule, 2000 International Unit= 1 cap(s), Oral, daily  Allergies    No Known Medication Allergies  Social History    Smoking Status     Never smoker     Alcohol - Denies Alcohol Use     Electronic Cigarette/Vaping      Electronic Cigarette Use: Never.     Employment/School      Student     Exercise - Occasional exercise     Tobacco      Never (less than 100 in  lifetime)  Family History    Cancer: Grandmother (M).    Diabetes mellitus: Mother, Grandmother (M) and Grandmother (P).    Heart disease: Grandfather (P) and Grandmother (P).    Hypertension: Grandfather (M) and Grandmother (M).  Immunizations      Vaccine Date Status          tetanus/diphth/pertuss (Tdap) adult/adol 01/13/2021 Given          influenza virus vaccine, inactivated 12/24/2015 Given              Comments : [12/24/2015] MOther          human papillomavirus vaccine 12/24/2015 Given              Comments : [12/24/2015] MOther          human papillomavirus vaccine 12/02/2014 Given          influenza virus vaccine, inactivated 12/02/2014 Given          human papillomavirus vaccine 08/19/2014 Given              Comments : [8/19/2014] L lower          meningococcal conjugate vaccine 08/19/2014 Given              Comments : [8/19/2014] L upper          influenza (LAIV) 10/08/2012 Given          influenza (LAIV) 10/20/2011 Given          Td 06/30/2009 Recorded          varicella 06/30/2009 Recorded          DTaP 07/02/2003 Recorded          IPV 07/02/2003 Recorded          MMR (measles/mumps/rubella) 07/02/2003 Recorded          Hib (HbOC) 10/05/1999 Recorded          DTaP 10/05/1999 Recorded          IPV 10/05/1999 Recorded          MMR (measles/mumps/rubella) 07/06/1999 Recorded          varicella 07/06/1999 Recorded          varicella 06/30/1999 Recorded          Hib (HbOC) 01/12/1999 Recorded          Hib (HbOC) 01/12/1999 Recorded          DTaP 01/12/1999 Recorded          Hep B 01/12/1999 Recorded          DTaP 1998 Recorded          IPV 1998 Recorded          Hib (HbOC) 1998 Recorded          DTaP 1998 Recorded          IPV 1998 Recorded          Hep B 1998 Recorded          Hep B 1998 Recorded  Lab Results          Lab Results (Last 4 results within 90 days)           Coronavirus SARS-CoV-2 (COVID-19) Ab IgG: NEGATIVE [NEGATIVE  - NEGATIVE] (02/03/21 14:33:00)           Coronavirus SARS-CoV-2 (COVID-19) Ab IgM: NEGATIVE [NEGATIVE  - NEGATIVE] (02/03/21 14:33:00)

## 2022-02-15 NOTE — PROGRESS NOTES
Chief Complaint    Left knee and left side pain started a week ago after he was thrown into the air and landed on cement.  History of Present Illness      Trying to get pig to move. Put bucket over pigs head and knocked him back 5 feet with landing on concrete. Having pain on medial left knee and left hip. Able to put full weight on left leg. Does not feel pain decreasing and making it difficult to sleep. Not limping. Taking 400mg ibuprofen daily. Knee does not lock, catch or give out.  Review of Systems      No weakness       No numbness or tingling  Physical Exam   Vitals & Measurements    T: 99.3(Tympanic)  HR: 88(Peripheral)  BP: 114/78     HT: 67.25 in  WT: 323.6 lb  BMI: 50.3       General: No acute distress      Musculoskeletal: Normal gait.  Good strength and range of motion of the leg.  Good flexion extension at the knee.  Good internal and external rotation of the hip.  Negative anterior drawer, Lachman and Sunil's test.  Varus and valgus stress testing are negative.      Skin: No bruising or swelling.  Assessment/Plan   Left leg pain: Doubt fracture.  Discussed and patient declines x-ray today.  Most likely contusion.  Will focus on Naprosyn twice daily with food and follow-up if not improving over the next 10 days  Patient Information     Name:DINO ARORA      Address:      [address] [place] [address] [address] [place] [id number]     Sex:Male     YOB: 2000     [phone]     Emergency Contact:DECLINED, UNKNOWN     MRN:[id number]     FIN:[id number]     Location:[facility]     Date of Service:11/30/2017      Primary Care Physician:       Ramirez Cintron MD, [phone]  Procedure/Surgical History     Ganglion cyst (05/23/2000)  Medications    Acular 0.5% ophthalmic solution, 1 drop(s), left eye, qid, PRN    amitriptyline 25 mg oral tablet, 75 mg= 3 tab(s), po, hs, 5 refills    amitriptyline 25 mg oral tablet, 75 mg= 3 tab(s), po, hs, 2 refills    citalopram 20 mg oral  tablet, 40 mg= 2 tab(s), po, daily, 3 refills    cyclobenzaprine 10 mg oral tablet, See Instructions, 2 refills    melatonin 3 mg oral tablet, 3 mg= 1 tab(s), po, PRN    topiramate 50 mg oral tablet, 100 mg= 2 tab(s), po, daily    Vitamin D3 2000 intl units oral capsule, 2000 International Unit= 1 cap(s), po, daily  Allergies    No Known Medication Allergies  Lab Results      Results (Last 90 days)      No results located.

## 2022-02-15 NOTE — NURSING NOTE
Comprehensive Intake Entered On:  1/13/2021 4:13 PM CST    Performed On:  1/13/2021 4:09 PM CST by Beatris Babin               Summary   Chief Complaint :   Animal bite, laceration to back of right leg.    Menstrual Status :   N/A   Weight Measured :   307.8 lb(Converted to: 307 lb 13 oz, 139.616 kg)    Systolic Blood Pressure :   91 mmHg   Diastolic Blood Pressure :   47 mmHg (LOW)    Mean Arterial Pressure :   62 mmHg   Peripheral Pulse Rate :   86 bpm   Vital Signs Comments :   thigh cuff used   BP Method :   Electronic   HR Method :   Electronic   Temperature Tympanic :   98.2 DegF(Converted to: 36.8 DegC)    Beatris Babin - 1/13/2021 4:09 PM CST   Meds / Allergies   (As Of: 1/13/2021 4:13:54 PM CST)   Allergies (Active)   No Known Medication Allergies  Estimated Onset Date:   Unspecified ; Created By:   Kiko Layne CMA; Reaction Status:   Active ; Category:   Drug ; Substance:   No Known Medication Allergies ; Type:   Allergy ; Updated By:   Kiko Layne CMA; Reviewed Date:   6/23/2020 2:48 PM CDT        Medication List   (As Of: 1/13/2021 4:13:54 PM CST)   Prescription/Discharge Order    citalopram  :   citalopram ; Status:   Prescribed ; Ordered As Mnemonic:   citalopram 20 mg oral tablet ; Simple Display Line:   40 mg, 2 tab(s), po, daily, 60 tab(s), 5 Refill(s) ; Ordering Provider:   Ramirez Cintron MD; Catalog Code:   citalopram ; Order Dt/Tm:   1/8/2018 2:58:22 PM CST          cyclobenzaprine  :   cyclobenzaprine ; Status:   Prescribed ; Ordered As Mnemonic:   cyclobenzaprine 10 mg oral tablet ; Simple Display Line:   See Instructions, TAKE ONE TABLET BY MOUTH EVERY 6 HOURS AS NEEDED FOR SPASM, 30 tab(s), 5 Refill(s) ; Ordering Provider:   Ramirez Cintron MD; Catalog Code:   cyclobenzaprine ; Order Dt/Tm:   6/27/2019 12:57:21 PM CDT          fluticasone nasal  :   fluticasone nasal ; Status:   Prescribed ; Ordered As Mnemonic:   fluticasone 50 mcg/inh nasal spray ; Simple Display Line:    2 spray(s), Nasal, daily, 16 gm, 1 Refill(s) ; Ordering Provider:   Ramirez Cintron MD; Catalog Code:   fluticasone nasal ; Order Dt/Tm:   11/24/2020 10:18:41 AM CST          topiramate  :   topiramate ; Status:   Prescribed ; Ordered As Mnemonic:   topiramate 50 mg oral tablet ; Simple Display Line:   100 mg, 2 tab(s), po, daily, 60 tab(s), 5 Refill(s) ; Ordering Provider:   Ramirez Cintron MD; Catalog Code:   topiramate ; Order Dt/Tm:   1/8/2018 2:58:36 PM CST            Home Meds    cholecalciferol  :   cholecalciferol ; Status:   Documented ; Ordered As Mnemonic:   Vitamin D3 2000 intl units oral capsule ; Simple Display Line:   2,000 International Unit, 1 cap(s), PO, daily ; Catalog Code:   cholecalciferol ; Order Dt/Tm:   7/26/2012 1:23:06 PM CDT          ibuprofen  :   ibuprofen ; Status:   Documented ; Ordered As Mnemonic:   ibuprofen 200 mg oral capsule ; Simple Display Line:   400 mg, 2 cap(s), Oral, q6 hrs, PRN: as needed for pain, 0 Refill(s) ; Catalog Code:   ibuprofen ; Order Dt/Tm:   6/23/2020 2:49:29 PM CDT          melatonin  :   melatonin ; Status:   Documented ; Ordered As Mnemonic:   melatonin 3 mg oral tablet ; Simple Display Line:   3 mg, 1 tab(s), po, tab(s), PRN: as needed for insomnia ; Catalog Code:   melatonin ; Order Dt/Tm:   5/20/2011 3:01:30 PM CDT            ID Risk Screen   Recent Travel History :   No recent travel   Family Member Travel History :   No recent travel   Other Exposure to Infectious Disease :   Unknown   Beatris Babin - 1/13/2021 4:09 PM CST   Social History   Social History   (As Of: 1/13/2021 4:13:54 PM CST)   Alcohol:  Denies Alcohol Use      (Last Updated: 4/19/2010 4:03:19 PM CDT by Elizabeth Farah CMA )         Tobacco:        Never (less than 100 in lifetime)   (Last Updated: 1/13/2021 4:10:29 PM CST by Beatris Babin)          Electronic Cigarette/Vaping:        Electronic Cigarette Use: Never.   (Last Updated: 1/13/2021 4:10:33 PM CST by Beatris Babin)           Employment/School:        Student   (Last Updated: 3/21/2012 12:50:20 PM CDT by Kym Camp)          Exercise:  Occasional exercise      (Last Updated: 4/19/2010 4:03:30 PM CDT by Elizabeth Farah CMA )

## 2022-02-15 NOTE — PROGRESS NOTES
Patient:   DINO ARORA            MRN: [id number]            FIN: [id number]               Age:   17 years     Sex:  Male     :  2000   Associated Diagnoses:   Migraine; HAN (generalized anxiety disorder); Insomnia   Author:   Ramirez Cintron MD      Impression and Plan   Diagnosis     Migraine (KCB33-YL G43.009).     Course:  Progressing as expected.    Orders     Orders (Selected)   Prescriptions  Prescribed  cyclobenzaprine 10 mg oral tablet: See Instructions, Instructions: TAKE ONE TABLET BY MOUTH EVERY 6 HOURS AS NEEDED FOR SPASM, # 30 tab(s), 5 Refill(s), Type: Soft Stop, Pharmacy: [id number], TAKE ONE TABLET BY MOUTH EVERY 6 HOURS AS NEEDED FOR SPASM  topiramate 50 mg oral tablet: 2 tab(s) ( 100 mg ), po, daily, # 60 tab(s), 5 Refill(s), Type: Maintenance, Pharmacy: [id number], Pt due in Sept for office visit, 2 tab(s) po daily  Completed  Naprosyn 500 mg oral tablet: 1 tab(s) ( 500 mg ), PO, BID, # 28 tab(s), 0 Refill(s), Type: Maintenance, Pharmacy: [id number], 1 tab(s) po bid,x14 day(s).     Diagnosis     HAN (generalized anxiety disorder) (JJT13-VL F41.1).     Course:  Progressing as expected.    Orders     Orders (Selected)   Prescriptions  Prescribed  citalopram 20 mg oral tablet: 2 tab(s) ( 40 mg ), po, daily, # 60 tab(s), 5 Refill(s), Type: Maintenance, Pharmacy: [id number], 2 tab(s) po daily.     Diagnosis     Insomnia (BVT00-LQ F51.01).     Course:  Well controlled.    Orders     Orders (Selected)   Prescriptions  Prescribed  amitriptyline 25 mg oral tablet: 3 tab(s) ( 75 mg ), po, hs, # 90 tab(s), 5 Refill(s), Pharmacy: [id number], 3 tab(s) po hs.        Visit Information      Date of Service: 2018 02:35 pm  Performing Location: [facility]  Encounter#: [id number]      Primary Care Provider (PCP):  Ramirez Cintron MD    NPI# [id number]   Visit type:  Scheduled follow-up.    Accompanied by:  Mother.    Source of  history:  Self, Mother.    Referral source:  Self.    History limitation:  None.       Chief Complaint   1/8/2018 2:41 PM CST     Pt here for right shoulder pain        History of Present Illness             The patient presents with insomnia.  The severity of the insomnia is severe.  The insomnia is constant.  The insomnia has lasted for several years.  There are no modifying factors.  Associated symptoms consist of fatigue.  Additional pertinent history: none.               The patient presents with anxiety.  The anxiety is characterized by difficulty concentrating, irritability, nervousness and restlessness.  The severity of the anxiety is moderate.  The anxiety is constant.  The context of the anxiety: occurred in association with the inability to cope with stress.  Exacerbating factors consist of emotional stress and social change.  Relieving factors consist of medication and stress reduction.        Interval History   Since the patient's last visit for a migraine:   The patient reports the migraine episodes have decreased.  There has been no effect on the patients daily activities.  Associated symptom(s) consist of sensitivity to light, visual disturbance, nausea, vomiting and no aura.     Review of Systems   Constitutional:  Negative.    Eye:  Negative.    Ear/Nose/Mouth/Throat:  Negative.    Respiratory:  Negative.    Cardiovascular:  Negative.    Gastrointestinal:  Negative.    Genitourinary:  Negative.    Hematology/Lymphatics:  Negative.    Endocrine:  Negative.    Immunologic:  Negative.    Musculoskeletal:  Negative.    Integumentary:  Negative.    Neurologic:  Negative.    Psychiatric:  Negative.    All other systems reviewed and negative      Health Status   Allergies:    Allergic Reactions (Selected)  No Known Medication Allergies   Medications:  (Selected)   Prescriptions  Prescribed  amitriptyline 25 mg oral tablet: 3 tab(s) ( 75 mg ), po, hs, # 90 tab(s), 5 Refill(s), Pharmacy: [id number], 3 tab(s) po hs  citalopram 20 mg oral tablet: 2 tab(s) ( 40 mg ), po, daily, # 60 tab(s), 5 Refill(s), Type: Maintenance, Pharmacy: [id number], 2 tab(s) po daily  cyclobenzaprine 10 mg oral tablet: See Instructions, Instructions: TAKE ONE TABLET BY MOUTH EVERY 6 HOURS AS NEEDED FOR SPASM, # 30 tab(s), 5 Refill(s), Type: Soft Stop, Pharmacy: [facility] #[id number], TAKE ONE TABLET BY MOUTH EVERY 6 HOURS AS NEEDED FOR SPASM  topiramate 50 mg oral tablet: 2 tab(s) ( 100 mg ), po, daily, # 60 tab(s), 5 Refill(s), Type: Maintenance, Pharmacy: [facility] #[id number], Pt due in Sept for office visit, 2 tab(s) po daily  Documented Medications  Documented  Vitamin D3 2000 intl units oral capsule: 1 cap(s) ( 2,000 International Unit ), PO, daily, 0 Refill(s), Type: Soft Stop  melatonin 3 mg oral tablet: 1 tab(s) ( 3 mg ), po, PRN: as needed for insomnia, tab(s), 0 Refill(s), Type: Maintenance   Problem list:    All Problems  HAN (generalized anxiety disorder) / SNOMED CT 05340974 / Confirmed  Insomnia / SNOMED CT 6290074258 / Confirmed  Migraine / SNOMED CT 8181008271 / Confirmed  Moderate persistent asthma / SNOMED CT 9456247616 / Confirmed  Obesity / ICD-9-.00 / Confirmed  Vitamin D Deficiency / ICD-9-.9 / Confirmed      Histories   Past Medical History:    Active  Obesity (278.00)  Insomnia (3133035733)   Family History:    Cancer  Grandmother (M)  Diabetes mellitus  Mother (Naila)  Grandmother (M)  Grandmother (P)  Hypertension  Grandfather (M)  Grandmother (M)  Heart disease  Grandmother (P)  Grandfather (P)     Procedure history:    Ganglion cyst (SNOMED CT 620162616) on 5/23/2000 at 22 Months.  Comments:  4/19/2010 3:59 PM - Farah , Elizabeth  excision rt wrist   Social History:        Alcohol Assessment: Denies Alcohol Use      Tobacco Assessment: Denies Tobacco Use      Employment and Education Assessment            Student      Exercise and Physical Activity Assessment: Occasional exercise         Physical Examination   Vital Signs   1/8/2018 2:41 PM CST Peripheral Pulse Rate 106 bpm  HI    Systolic Blood Pressure 118 mmHg    Diastolic Blood Pressure 76 mmHg    Mean Arterial Pressure 90 mmHg    BP Site Right arm    Oxygen Saturation 97 %      Measurements from flowsheet : Measurements   1/8/2018 2:41 PM CST Height Measured - Standard 67.25 in    Weight Measured - Standard 320.2 lb    BSA 2.62 m2    Body Mass Index 49.77 kg/m2    Body Mass Index Percentile 99.91      General:  No acute distress.    Neck:  Supple, No lymphadenopathy, No thyromegaly.    Respiratory:  Lungs are clear to auscultation, Respirations are non-labored, Breath sounds are equal, Symmetrical chest wall expansion.    Cardiovascular:  Normal rate, Regular rhythm, No murmur, No gallop, Good pulses equal in all extremities, Normal peripheral perfusion, No edema.    Gastrointestinal:  Soft, Non-tender, Non-distended, Normal bowel sounds, No organomegaly.    Integumentary:  Warm, Dry, Pink.    Neurologic:  Alert, Oriented.    Psychiatric:  Cooperative, Appropriate mood & affect, Non-suicidal.         Mood and affect: Calm.         Behavior: Relaxed.         Judgment: Able to make sensible decisions, Appropriate in social situations.         Thought process: Appropriate.

## 2022-02-15 NOTE — PROGRESS NOTES
Patient:   DINO ARORA            MRN: [id number]            FIN: [id number]               Age:   20 years     Sex:  Male     :  2000   Associated Diagnoses:   Left shoulder pain   Author:   Leonel Madden PA-C      Report Summary   Diagnosis  Left shoulder pain (XTD31-AT M25.512).  Patient Instructions   Visit Information      Date of Service: 2021 02:22 pm  Performing Location: [facility]  Encounter#: [id number]      Primary Care Provider (PCP):  Ramirez Cintron MD    NPI# [id number]      Referring Provider:  Leonel Madden PA-C    NPI# [id number]   Visit type:  New symptom.    Accompanied by:  No one.    Source of history:  Self.    Referral source:  Self.    History limitation:  None.       Chief Complaint   2021 2:34 PM CDT    Back Pain - 2 weeks, no known injury        History of Present Illness             The patient presents with upper extremity pain.  The location of pain is the left and shoulder(s).  The pain is characterized by aching, throbbing and with movement.  The severity of the pain is moderate.  The timing/course of the pain is constant.  The pain occurred 3 week(s).  The context of the pain: occurred with movement.  Works at [employer] and doing chores at home with hogs. No specific injury. No cough. No SOB no fever or chills. No rash. No past history of similar pain. Some pain at rest, more pain with activity. No paresthesias. No weakness. .  Relieving factors consist of none.  Associated symptoms consist of decreased range of motion, denies numbness and denies tingling.  Prior treatment consists of none.  See CC above.   .        Review of Systems   Constitutional:  Negative.    Respiratory:  Negative.    Cardiovascular:  Negative.    Musculoskeletal:  Negative except as documented in history of present illness.    Integumentary:  Negative.    Neurologic:  Negative.       Health Status   Allergies:    Allergic Reactions (All)  No Known Medication  Allergies  Canceled/Inactive Reactions (All)  No known allergies   Medications:  (Selected)   Prescriptions  Prescribed  Medrol Dosepak 4 mg oral tablet: = 1 packet(s), Oral, once, Instructions: as directed on package labeling, # 21 tab(s), 0 Refill(s), Type: Soft Stop, Pharmacy: [facility], 1 packet(s) Oral once,Instr:as directed on package labeling, 67.25, in, 04/28/21 14:34:...  albuterol 90 mcg/inh inhalation aerosol: 2 puff(s), Inhale, q4-6 hrs, PRN: for cough, # 1 EA, 1 Refill(s), Type: Maintenance, Pharmacy: [facility], 2 puff(s) Inhale q4-6 hrs,PRN:for cough, 67.25, in, 02/08/21 13:58:00 CST, Height Measured, 140, lb, 02/08/21 13:58:00...  citalopram 20 mg oral tablet: 2 tab(s) ( 40 mg ), po, daily, # 60 tab(s), 5 Refill(s), Type: Maintenance, Pharmacy: [id number], 2 tab(s) po daily  cyclobenzaprine 10 mg oral tablet: See Instructions, Instructions: TAKE ONE TABLET BY MOUTH EVERY 6 HOURS AS NEEDED FOR SPASM, # 30 tab(s), 5 Refill(s), Type: Soft Stop, Pharmacy: [facility], TAKE ONE TABLET BY MOUTH EVERY 6 HOURS AS NEEDED FOR SPASM  fluticasone 50 mcg/inh nasal spray: = 2 spray(s), Nasal, daily, # 16 gm, 1 Refill(s), Type: Maintenance, Pharmacy: [facility], 2 spray(s) Nasal daily, 67.25, in, 05/21/19 10:09:00 CDT, Height Measured, 303.6, lb, 05/21/19 10:09:00 CDT, Weight Measured  topiramate 50 mg oral tablet: 2 tab(s) ( 100 mg ), po, daily, # 60 tab(s), 5 Refill(s), Type: Maintenance, Pharmacy: [id number], Pt due in Sept for office visit, 2 tab(s) po daily  Documented Medications  Documented  Vitamin D3 2000 intl units oral capsule: 1 cap(s) ( 2,000 International Unit ), PO, daily, 0 Refill(s), Type: Soft Stop  ibuprofen 200 mg oral capsule: = 2 cap(s) ( 400 mg ), Oral, q6 hrs, PRN: as needed for pain, 0 Refill(s), Type: Maintenance  melatonin 3 mg oral tablet: 1 tab(s) ( 3 mg ), po, PRN:  as needed for insomnia, tab(s), 0 Refill(s), Type: Maintenance   Problem list:    All Problems  Vitamin D Deficiency / ICD-9-.9 / Confirmed  Obesity / ICD-9-.00 / Confirmed  Moderate persistent asthma / SNOMED CT 3139499154 / Confirmed  Migraine / SNOMED CT 0134558901 / Confirmed  Insomnia / SNOMED CT 4326938631 / Confirmed  HAN (generalized anxiety disorder) / SNOMED CT 13115481 / Confirmed  Allergic rhinitis / SNOMED CT 009825337 / Confirmed  Acute medial meniscus tear of right knee / SNOMED CT 018562284 / Confirmed      Histories   Past Medical History:    Active  Obesity (278.00)  Insomnia (6995839255)   Family History:    Cancer  Grandmother (M)  Diabetes mellitus  Mother (Naila)  Grandmother (M)  Grandmother (P)  Hypertension  Grandfather (M)  Grandmother (M)  Heart disease  Grandmother (P)  Grandfather (P)     Procedure history:    Ganglion cyst (547330697) on 5/23/2000 at 20 Months.  Comments:  4/19/2010 3:59 PM CDT - Farah , Elizabeth  excision rt wrist   Social History:        Electronic Cigarette/Vaping Assessment            Electronic Cigarette Use: Never.      Alcohol Assessment: Denies Alcohol Use      Tobacco Assessment            Never (less than 100 in lifetime)      Employment and Education Assessment            Student      Exercise and Physical Activity Assessment: Occasional exercise        Physical Examination   Vital Signs   4/28/2021 2:34 PM CDT Peripheral Pulse Rate 84 bpm    Pulse Site Radial artery    HR Method Manual    Systolic Blood Pressure 122 mmHg    Diastolic Blood Pressure 82 mmHg  HI    Mean Arterial Pressure 95 mmHg    BP Site Right arm    BP Method Manual      Measurements from flowsheet : Measurements   4/28/2021 2:34 PM CDT Height Measured - Standard 67.25 in    Weight Measured - Standard 310 lb    BSA 2.58 m2    Body Mass Index 48.19 kg/m2  HI      General:  Alert and oriented, Moderate distress.    Respiratory:  Lungs are clear to auscultation, Respirations are  non-labored.    Cardiovascular:  Normal rate, Regular rhythm, Good pulses equal in all extremities, Normal peripheral perfusion.    Musculoskeletal:  Normal strength, No swelling, No deformity, Tender at T7 region at base of scapula. , Painful, but full ROM.    Integumentary:  No rash.    Neurologic:  Normal deep tendon reflexes.    Psychiatric:  Cooperative, Appropriate mood & affect.       Review / Management   Radiology results   Appears normal to my read, waiting for official read.  Will contact patient with any other findings. no acute fracture noted      Impression and Plan   Diagnosis     Left shoulder pain (CFZ85-OQ M25.512).     Patient Instructions:       Counseled: Patient, Regarding diagnosis, Regarding medications, Activity, Verbalized understanding.    Ice alternate with heat. Medrol dose mai. RTC in one week to FU and prior if worse, SOB, rash.

## 2022-02-15 NOTE — NURSING NOTE
Comprehensive Intake Entered On:  12/23/2021 3:33 PM CST    Performed On:  12/23/2021 3:27 PM CST by Linda Richards CMA               Summary   Chief Complaint :   c/o left knee pain since last week, getting worse, pain when bending or using stairs    Menstrual Status :   N/A   Weight Measured :   319.9 lb(Converted to: 319 lb 14 oz, 145.104 kg)    Height Measured :   67.25 in(Converted to: 5 ft 7 in, 170.81 cm)    Body Mass Index :   49.73 kg/m2 (HI)    Body Surface Area :   2.62 m2   Systolic Blood Pressure :   122 mmHg   Diastolic Blood Pressure :   76 mmHg   Mean Arterial Pressure :   91 mmHg   Peripheral Pulse Rate :   78 bpm   BP Site :   Right arm   Pulse Site :   Radial artery   BP Method :   Manual   HR Method :   Electronic   Temperature Tympanic :   98.3 DegF(Converted to: 36.8 DegC)    Oxygen Saturation :   97 %   Linda Richards CMA - 12/23/2021 3:27 PM CST   Health Status   Allergies Verified? :   Yes   Medication History Verified? :   Yes   Immunizations Current :   Yes   Medical History Verified? :   No   Pre-Visit Planning Status :   Not completed   Tobacco Use? :   Never smoker   Linda Richards CMA - 12/23/2021 3:27 PM CST   Consents   Consent for Immunization Exchange :   Consent Granted   Consent for Immunizations to Providers :   Consent Granted   Linda Richards CMA - 12/23/2021 3:27 PM CST   Meds / Allergies   (As Of: 12/23/2021 3:33:37 PM CST)   Allergies (Active)   No Known Medication Allergies  Estimated Onset Date:   Unspecified ; Created By:   Kiko Layne CMA; Reaction Status:   Active ; Category:   Drug ; Substance:   No Known Medication Allergies ; Type:   Allergy ; Updated By:   Kiko Layne CMA; Reviewed Date:   12/23/2021 3:30 PM CST        Medication List   (As Of: 12/23/2021 3:33:37 PM CST)   Prescription/Discharge Order    albuterol  :   albuterol ; Status:   Completed ; Ordered As Mnemonic:   albuterol 90 mcg/inh inhalation aerosol ; Simple Display Line:   2 puff(s),  Inhale, q4-6 hrs, PRN: for cough, 1 EA, 1 Refill(s) ; Ordering Provider:   Zachary Lunsford MD; Catalog Code:   albuterol ; Order Dt/Tm:   2/8/2021 2:33:05 PM CST          citalopram  :   citalopram ; Status:   Prescribed ; Ordered As Mnemonic:   citalopram 20 mg oral tablet ; Simple Display Line:   40 mg, 2 tab(s), po, daily, 60 tab(s), 5 Refill(s) ; Ordering Provider:   Ramirez Cintron MD; Catalog Code:   citalopram ; Order Dt/Tm:   1/8/2018 2:58:22 PM CST          cyclobenzaprine  :   cyclobenzaprine ; Status:   Prescribed ; Ordered As Mnemonic:   cyclobenzaprine 10 mg oral tablet ; Simple Display Line:   See Instructions, TAKE ONE TABLET BY MOUTH EVERY 6 HOURS AS NEEDED FOR SPASM, 30 tab(s), 5 Refill(s) ; Ordering Provider:   Ramirez Cintron MD; Catalog Code:   cyclobenzaprine ; Order Dt/Tm:   6/27/2019 12:57:21 PM CDT          fluticasone nasal  :   fluticasone nasal ; Status:   Prescribed ; Ordered As Mnemonic:   fluticasone 50 mcg/inh nasal spray ; Simple Display Line:   2 spray(s), Nasal, daily, 16 gm, 1 Refill(s) ; Ordering Provider:   Ramirez Cintron MD; Catalog Code:   fluticasone nasal ; Order Dt/Tm:   11/24/2020 10:18:41 AM CST          methylPREDNISolone  :   methylPREDNISolone ; Status:   Completed ; Ordered As Mnemonic:   Medrol Dosepak 4 mg oral tablet ; Simple Display Line:   1 packet(s), Oral, once, as directed on package labeling, 21 tab(s), 0 Refill(s) ; Ordering Provider:   Leonel Madden PA-C; Catalog Code:   methylPREDNISolone ; Order Dt/Tm:   4/28/2021 2:51:22 PM CDT          topiramate  :   topiramate ; Status:   Prescribed ; Ordered As Mnemonic:   topiramate 50 mg oral tablet ; Simple Display Line:   100 mg, 2 tab(s), po, daily, 60 tab(s), 5 Refill(s) ; Ordering Provider:   Ramirez Cintron MD; Catalog Code:   topiramate ; Order Dt/Tm:   1/8/2018 2:58:36 PM CST            Home Meds    cetirizine  :   cetirizine ; Status:   Documented ; Ordered As Mnemonic:   ZyrTEC 10 mg oral tablet ; Simple  Display Line:   10 mg, 1 tab(s), Oral, daily, 0 Refill(s) ; Catalog Code:   cetirizine ; Order Dt/Tm:   12/23/2021 3:31:29 PM CST          cholecalciferol  :   cholecalciferol ; Status:   Completed ; Ordered As Mnemonic:   Vitamin D3 2000 intl units oral capsule ; Simple Display Line:   2,000 International Unit, 1 cap(s), PO, daily ; Catalog Code:   cholecalciferol ; Order Dt/Tm:   7/26/2012 1:23:06 PM CDT          ibuprofen  :   ibuprofen ; Status:   Documented ; Ordered As Mnemonic:   ibuprofen 200 mg oral capsule ; Simple Display Line:   400 mg, 2 cap(s), Oral, q6 hrs, PRN: as needed for pain, 0 Refill(s) ; Catalog Code:   ibuprofen ; Order Dt/Tm:   6/23/2020 2:49:29 PM CDT          melatonin  :   melatonin ; Status:   Documented ; Ordered As Mnemonic:   melatonin 3 mg oral tablet ; Simple Display Line:   3 mg, 1 tab(s), po, tab(s), PRN: as needed for insomnia ; Catalog Code:   melatonin ; Order Dt/Tm:   5/20/2011 3:01:30 PM CDT            Social History   Social History   (As Of: 12/23/2021 3:33:37 PM CST)   Alcohol:  Denies Alcohol Use      (Last Updated: 4/19/2010 4:03:19 PM CDT by Elizabeth Farah CMA )         Tobacco:        Never (less than 100 in lifetime)   (Last Updated: 1/13/2021 4:10:29 PM CST by Beatris Babin)          Electronic Cigarette/Vaping:        Electronic Cigarette Use: Never.   (Last Updated: 1/13/2021 4:10:33 PM CST by Beatris Babin)          Employment/School:        Student   (Last Updated: 3/21/2012 12:50:20 PM CDT by Kym Camp)          Exercise:  Occasional exercise      (Last Updated: 4/19/2010 4:03:30 PM CDT by Elizabeth Farah CMA )

## 2022-02-15 NOTE — NURSING NOTE
Comprehensive Intake Entered On:  10/30/2020 11:54 AM CDT    Performed On:  10/30/2020 11:52 AM CDT by Ayse Powers               Summary   Chief Complaint :   Pt consent to video vist. Would like to be test for COVID. Was an a trip 10/25 and a few friend have been positive this week. His symptoms are HA, cough, muscle aches.    Menstrual Status :   N/A   Ayse Powers - 10/30/2020 11:52 AM CDT   Health Status   Allergies Verified? :   Yes   Medication History Verified? :   Yes   Immunizations Current :   Yes   Medical History Verified? :   Yes   Pre-Visit Planning Status :   Completed   Tobacco Use? :   Never smoker   Ayse Powers - 10/30/2020 11:52 AM CDT   ID Risk Screen   Recent Travel History :   No recent travel   Family Member Travel History :   No recent travel   Other Exposure to Infectious Disease :   Community exposure to COVID-19 within the last 14 days   Ayse Powers - 10/30/2020 11:52 AM CDT

## 2022-02-15 NOTE — PROGRESS NOTES
Patient:   DINO ARORA            MRN: [id number]            FIN: [id number]               Age:   19 years     Sex:  Male     :  2000   Associated Diagnoses:   Pain of left calf   Author:   Ramirez Cintron MD      Impression and Plan   Diagnosis     Pain of left calf (HTJ01-FD M79.662).     Course:  Worsening.    Orders     Orders   Charges (Evaluation and Management):  44118 office outpatient visit 25 minutes (Charge) (Order): Quantity: 1, Pain of left calf.     Orders (Selected)   Outpatient Orders  Ordered  MRI Spine Lumbar w/o Contrast (Request): Pain of left calf  US Lower Extremity Venous Doppler (Request): Instructions: Specify  Left, Pain of left calf.        Visit Information      Date of Service: 2020 01:50 pm  Performing Location: [facility]  Encounter#: [id number]      Primary Care Provider (PCP):  Ramirez Cintron MD    NPI# [id number]      Referring Provider:  Ramirez Cintron MD, NPI# [id number]   Visit type:  Video Visit via Doximity.    Participants in room during visit:  _Patient only   Accompanied by:  Family member.    Source of history:  Self.    Location of patient:  _home  Location of provider:  _ Clinic  Video Start Time:  _1440  Video End Time:   _1500    Today's visit was conducted via video conference due to the COVID-19 pandemic.  The patient's consent to proceed with a video visit has been obtained and documented.   Referral source:  Self.    History limitation:  None.       Chief Complaint   2020 2:44 PM CDT    Video visit left calf pain x on and off the past month. Verbal consent for video visit.        History of Present Illness   Patient is a _ year old _ who is being evaluated via a billable video visit.             The patient presents with This is a video visit conducted due to the coronavirus pandemic.  The patient is a 19-year-old male with a chief complaint of left calf pain x1 month.  The pain started after the patient took a long drive to  [place] and back.  The calf pain is constant and is gradually getting worse.  He has been having difficulty sleeping for the past week because of the pain.  The patient denies any swelling of the leg and his mother confirmed this by measuring both calves and finding the equal.  There is been no incident where he might of strained a muscle.  He does have some lumbar pain.  He denies numbness or tingling or weakness of the left lower extremity.  He has been utilizing home physical modalities such as ice and stretching he is also been taking some ibuprofen without significant improvement.  We discussed the differential which includes lumbar radiculopathy, deep venous thrombosis, muscle strain, etc.  We agreed to initiate the work-up with an MRI of the lumbar spine and a Doppler ultrasound of the left lower extremity.  .        Review of Systems   Constitutional:  Negative.    Eye:  Negative.    Ear/Nose/Mouth/Throat:  Negative.    Respiratory:  Negative.    Cardiovascular:  Negative.    Gastrointestinal:  Negative.    Genitourinary:  Negative.    Immunologic:  Negative.    Musculoskeletal:  Negative.    Integumentary:  Negative.    Neurologic:  Negative.    Psychiatric:  Negative.       Health Status   Allergies:    Allergic Reactions (Selected)  No Known Medication Allergies   Medications:  (Selected)   Prescriptions  Prescribed  citalopram 20 mg oral tablet: 2 tab(s) ( 40 mg ), po, daily, # 60 tab(s), 5 Refill(s), Type: Maintenance, Pharmacy: [id number], 2 tab(s) po daily  cyclobenzaprine 10 mg oral tablet: See Instructions, Instructions: TAKE ONE TABLET BY MOUTH EVERY 6 HOURS AS NEEDED FOR SPASM, # 30 tab(s), 5 Refill(s), Type: Soft Stop, Pharmacy: [facility], TAKE ONE TABLET BY MOUTH EVERY 6 HOURS AS NEEDED FOR SPASM  fluticasone 50 mcg/inh nasal spray: = 2 spray(s), Nasal, daily, # 16 gm, 5 Refill(s), Type: Maintenance, Pharmacy: [facility]  topiramate  50 mg oral tablet: 2 tab(s) ( 100 mg ), po, daily, # 60 tab(s), 5 Refill(s), Type: Maintenance, Pharmacy: [id number], Pt due in Sept for office visit, 2 tab(s) po daily  Documented Medications  Documented  Vitamin D3 2000 intl units oral capsule: 1 cap(s) ( 2,000 International Unit ), PO, daily, 0 Refill(s), Type: Soft Stop  ibuprofen 200 mg oral capsule: = 2 cap(s) ( 400 mg ), Oral, q6 hrs, PRN: as needed for pain, 0 Refill(s), Type: Maintenance  melatonin 3 mg oral tablet: 1 tab(s) ( 3 mg ), po, PRN: as needed for insomnia, tab(s), 0 Refill(s), Type: Maintenance,    Medications          *denotes recorded medication          *Vitamin D3 2000 intl units oral capsule: 2,000 International Unit, 1 cap(s), PO, daily.          citalopram 20 mg oral tablet: 40 mg, 2 tab(s), po, daily, 60 tab(s), 5 Refill(s).          cyclobenzaprine 10 mg oral tablet: See Instructions, TAKE ONE TABLET BY MOUTH EVERY 6 HOURS AS NEEDED FOR SPASM, 30 tab(s), 5 Refill(s).          fluticasone 50 mcg/inh nasal spray: 2 spray(s), Nasal, daily, 16 gm, 5 Refill(s).          *ibuprofen 200 mg oral capsule: 400 mg, 2 cap(s), Oral, q6 hrs, PRN: as needed for pain, 0 Refill(s).          *melatonin 3 mg oral tablet: 3 mg, 1 tab(s), po, tab(s), PRN: as needed for insomnia.          topiramate 50 mg oral tablet: 100 mg, 2 tab(s), po, daily, 60 tab(s), 5 Refill(s).       Problem list:    All Problems  Acute medial meniscus tear of right knee / SNOMED CT 714680077 / Confirmed  Allergic rhinitis / SNOMED CT 007399054 / Confirmed  HAN (generalized anxiety disorder) / SNOMED CT 86071175 / Confirmed  Insomnia / SNOMED CT 7631500581 / Confirmed  Migraine / SNOMED CT 1636191947 / Confirmed  Moderate persistent asthma / SNOMED CT 7310408401 / Confirmed  Obesity / ICD-9-.00 / Confirmed  Vitamin D Deficiency / ICD-9-.9 / Confirmed      Histories   Past Medical History:    Active  Obesity (278.00)  Insomnia (9180787426)   Family History:     Cancer  Grandmother (M)  Diabetes mellitus  Mother (Naila)  Grandmother (M)  Grandmother (P)  Hypertension  Grandfather (M)  Grandmother (M)  Heart disease  Grandmother (P)  Grandfather (P)     Procedure history:    Ganglion cyst (SNOMED CT 440818913) on 5/23/2000 at 22 Months.  Comments:  4/19/2010 3:59 PM CDT - Farah , Elizabeth  excision rt wrist   Social History:        Alcohol Assessment: Denies Alcohol Use      Tobacco Assessment: Denies Tobacco Use      Employment and Education Assessment            Student      Exercise and Physical Activity Assessment: Occasional exercise        Physical Examination   General:  Alert and oriented, No acute distress.    Eye:  Pupils are equal, round and reactive to light, Extraocular movements are intact, Normal conjunctiva.    Respiratory:  Respirations are non-labored.    Integumentary:  Warm, Dry, Pink.    Neurologic:  Normal motor function.    Psychiatric:  Cooperative, Appropriate mood & affect, Normal judgment, Non-suicidal.         Mood and affect: Calm.         Behavior: Relaxed.         Judgment: Able to make sensible decisions, Appropriate in social situations.         Thought process: Appropriate.

## 2022-02-15 NOTE — TELEPHONE ENCOUNTER
---------------------  From: Violet May LPN (Phone Messages Pool ([id number]_[facility]))   To: Ritu Reilly CMA;     Sent: 7/24/2020 3:32:17 PM CDT  Subject: results     FYI    Phone Message    PCP:   MICK      Time of Call:  2:56pm       Person Calling:  pt mom  Phone number:  [phone] (home number)    Returned call at: _    Note:   Pt's mom LM stating pt had covid testing last Friday. She says it is critical that he get his results back because he is scheduled to have his wisdom teeth out and mom also scheduled for oral surgery. Per mom dentist will not cancel appts if there is a change results would be back on Monday.    Pt's mom says pt tried calling and was hung up on. She says pt was very bluntly told it would take 2 weeks for results which is not what he was told last week. She says then pt was hung up on- no documentation that pt called today or spoke with anyone about this.    pt's  mom says she is very displeased with this service.    Called pt as he is 20 and there is no JOSÉ MIGUEL to speak with pt mom. Explained to pt that I cannot speak with his mother since he is over 18. Informed pt results are taking at least 10-14 days and results are not back yet. Asked pt who he had spoke with earlier when he was hung up on and pt did not know. Apologized that this had happened and told him he will be contacted once results are available.      Last office visit and reason:  7/16/20 video visit, covid sxs, diarrheaPt was notified of results on 7/31/20.

## 2022-02-15 NOTE — TELEPHONE ENCOUNTER
DINO ARORA  : 2000  MRN: [id number]  PHONE NOTE   The patient was given the results of his MRI of the lumbar spine done on 2020 at the [facility].  He has lumbar spondylosis most evident at L5-S1 where there is a shallow posterior disc bulge causing borderline right and moderate left foraminal stenosis.  He also has shallow foraminal disc bulges at L3-4 and L4-5.  There is no spinal canal stenosis.    I believe his sciatic type pain in the left leg is caused by compression of the left L5-S1 nerve root.  I am going to put him on prednisone mg b.i.d.  He will continue with physical modalities such as rest, ice, and gentle stretching.  I am also going to arrange for him to have physical therapy.  He will give me a followup call next week with how he is progressing.  D:  2020  T:  2020  Ramirez Cintron MD/naz

## 2022-02-15 NOTE — TELEPHONE ENCOUNTER
---------------------  From: Ayse Powers (Phone Messages Pool ([id number]_[facility]))   Sent: 6/29/2020 9:12:43 AM CDT  Subject: Med refill      Phone Message    PCP:   MICK    Time of Call:  8:59       Person Calling:  pt's mother  Phone number:  [phone] NO JOSÉ MIGUEL  Returned call at: 9:09am  Last office visit and reason:  calf pain 6/23/20    Note: Mother called and asked that we refill pt's fluticasone. Mother states [facility] sent us a request last week (nothing in chart).  Called pt at [phone] and LVM. Asked that pt make a appt. Is due for px. Will send in 90 day supply. Did ask they fill out JOSÉ MIGUEL next time they are in clinic.

## 2022-02-15 NOTE — PROGRESS NOTES
Patient:   DINO ARORA            MRN: [id number]            FIN: [id number]               Age:   17 years     Sex:  Male     :  2000   Associated Diagnoses:   Subacromial bursitis of right shoulder joint   Author:   Ramirez Cintron MD      Impression and Plan   Diagnosis     Subacromial bursitis of right shoulder joint (OPG03-MG M75.51).     Orders     Orders (Selected)   Outpatient Orders  Order  36302 arthrocentesis aspir+/injection major jt/bursa (Charge): Quantity: 1, Subacromial bursitis of right shoulder joint  kenalog injection: 40 mg, intra-articular, once.     Counseled:  Patient, Regarding diagnosis, Regarding treatment, Regarding medications.       Procedure   Joint aspiration/ injection procedure   Date/ Time:  2018 3:05:00 PM.     Confirmed: patient, procedure, side, site, safety procedures followed.     Performed by: Ramirez Cintron MD.     Informed consent: verbal consent by patient.     Indication: symptomatic relief.     Location: the right shoulder.     Preparation and technique: skin prep povidone iodine (Betadine), sterile preparation of site (in usual fashion, with 10 % povidone iodine), sterile needle used (size #25 gauge, length 1.5 inch, Posterior approach).     Joint injected: with  Triamcinolone (40  mg, 1.0  ml).     Procedure tolerated: well.

## 2022-02-15 NOTE — TELEPHONE ENCOUNTER
---------------------  From: Corazon Mabry RN   Sent: 7/16/2020 3:28:03 PM CDT  Subject: Possible covid exposure      Phone Message    PCP:   Reva BARTON     Time of Call:  _ 1517       Person Calling:  _ mother of pt    Note:   _ Mother of pt wondering if pt should be tested for covid because [place] are a large number of cases. Pt has headache and stomach ache but no fever. Called pt back and transferred to scheduling for video visit.

## 2022-02-15 NOTE — TELEPHONE ENCOUNTER
---------------------  From: Leonel Madden PA-C   To: [email or web address] ([id number]);     Sent: 4/29/2021 7:47:46 AM CDT  Subject: General Message     Please call and tell him that his shoulder x-ray was normal.    KAHTime: 8:48 am  Note: patient was notified.

## 2022-02-15 NOTE — TELEPHONE ENCOUNTER
---------------------  From: Thaddeus TRUJILLO, Vanessa JOAQUIN   To: [facility] ([id number]_[place]);     Sent: 10/30/2020 12:00:35 PM CDT  Subject: covid test needed       Please call and schedule for curbside covid 19 testing today.  Thank you.SCHEDULED

## 2022-02-15 NOTE — PROGRESS NOTES
Patient:   DINO ARORA            MRN: [id number]            FIN: [id number]               Age:   17 years     Sex:  Male     :  2000   Associated Diagnoses:   Irritation of left eye   Author:   Ramirez Stevenson PA-C      Chief Complaint   2017 3:40 PM CDT    Pt here for itchy and swollen left eye with blurry vision x 5-6 days        History of Present Illness   Chief complaint and symptoms noted above and confirmed with patient   itchy left eye with some blurry vision for a few days,  does not feel like a foreign object in eye  no drainage  has tried some lubricating eye drops  has some seasonal allergies, takes zyrtec      Review of Systems   Eye:  Blurring.    Ear/Nose/Mouth/Throat:  Nasal congestion.       Health Status   Allergies:    Allergic Reactions (Selected)  No Known Medication Allergies   Medications:  (Selected)   Prescriptions  Prescribed  amitriptyline 25 mg oral tablet: 3 tab(s) ( 75 mg ), PO, Once a day (at bedtime), tab(s), 2 Refill(s), Type: Maintenance, Pharmacy: [id number]  amitriptyline 25 mg oral tablet: 3 tab(s) ( 75 mg ), po, hs, # 90 tab(s), 5 Refill(s), Pharmacy: [id number], 3 tab(s) po hs  citalopram 20 mg oral tablet: 2 tab(s) ( 40 mg ), po, daily, # 60 tab(s), 3 Refill(s), Type: Maintenance, Pharmacy: [id number], 2 tab(s) po daily  cyclobenzaprine 10 mg oral tablet: See Instructions, Instructions: TAKE ONE TABLET BY MOUTH EVERY 6 HOURS AS NEEDED FOR SPASM, # 30 tab(s), 2 Refill(s), Type: Soft Stop, Pharmacy: [id number], TAKE ONE TABLET BY MOUTH EVERY 6 HOURS AS NEEDED FOR SPASM  topiramate 50 mg oral tablet: 2 tab(s) ( 100 mg ), po, daily, # 60 tab(s), 0 Refill(s), Type: Maintenance, Pharmacy: [id number], Pt due in Sept for office visit  Documented Medications  Documented  Vitamin D3 2000 intl units oral capsule: 1 cap(s) ( 2,000 International Unit ), PO, daily, 0 Refill(s), Type: Soft Stop  melatonin 3 mg oral tablet:  1 tab(s) ( 3 mg ), po, PRN: as needed for insomnia, tab(s), 0 Refill(s), Type: Maintenance   Problem list:    All Problems  Migraine / SNOMED CT 1971107883 / Confirmed  Moderate persistent asthma / SNOMED CT 3444704164 / Confirmed  Vitamin D Deficiency / ICD-9-.9 / Confirmed  Obesity / ICD-9-.00 / Confirmed  Insomnia / SNOMED CT 8144905633 / Confirmed  HAN (generalized anxiety disorder) / SNOMED CT 62553347 / Confirmed      Histories   Past Medical History:    Active  Obesity (278.00)  Insomnia (5944162511)   Family History:    Cancer  Grandmother (M)  Diabetes mellitus  Mother (Naila)  Grandmother (M)  Grandmother (P)  Hypertension  Grandfather (M)  Grandmother (M)  Heart disease  Grandmother (P)  Grandfather (P)     Procedure history:    Ganglion cyst (384928942) on 5/23/2000 at 22 Months.  Comments:  4/19/2010 3:59 PM - Farah , Elizabeth  excision rt wrist      Physical Examination   Vital Signs   9/29/2017 3:40 PM CDT Temperature Tympanic 98.6 DegF    Peripheral Pulse Rate 100 bpm    Pulse Site Radial artery    Respiratory Rate 20 br/min    Systolic Blood Pressure 128 mmHg    Diastolic Blood Pressure 80 mmHg    Mean Arterial Pressure 96 mmHg    BP Site Right arm      General:  No acute distress.    Eye:  Pupils are equal, round and reactive to light, Extraocular movements are intact, Normal conjunctiva, visual acuity with correction: OD:20/30; OS:20/30; OU:20/25, Alcaine drops are placed in left eye.  Upper lid is everted, no foreign objects are seen.  Eye is stained with fluoroscein and illuminated with a black light, no corneal abrasion seen.   Flouoroscein is flushed away..    HENT:  Tympanic membranes are clear, No pharyngeal erythema, No sinus tenderness.       Impression and Plan   Diagnosis     Irritation of left eye (UJD52-AU H57.8).     Summary:  will treat with acular eye drops, continue with zyrtec,  folllow up with Dr Nolasco if not improving.    Orders     Orders   Pharmacy:  [facility] 0.5%  ophthalmic solution (Prescribe): 1 drop(s), left eye, QID, x 7 day(s), PRN: for itching, # 10 mL, 0 Refill(s), Type: Maintenance, Pharmacy: [id number], 1 drop(s) left eye qid,x7 day(s),PRN:for itching.     Orders   Charges (Evaluation and Management):  47729 office outpatient visit 15 minutes (Charge) (Order): Quantity: 1, Irritation of left eye.

## 2022-02-15 NOTE — PROGRESS NOTES
"   Patient:   DINO ARORA            MRN: [id number]            FIN: [id number]               Age:   18 years     Sex:  Male     :  2000   Associated Diagnoses:   Acute medial meniscus tear of right knee; Allergic rhinitis   Author:   Ramirez Cintron MD      Impression and Plan   Diagnosis     Acute medial meniscus tear of right knee (KNZ44-ES S83.241A).     Course:  Progressing as expected.    Plan:  conservative treatment - follow up if symptoms become worse.    Orders     Orders   Charges (Evaluation and Management):  62247 office outpatient visit 15 minutes (Charge) (Order): Quantity: 1, Acute medial meniscus tear of right knee.     Diagnosis     Allergic rhinitis (BVP86-UW J30.9).     Course:  Worsening.    Orders     Orders (Selected)   Prescriptions  Prescribed  fluticasone 50 mcg/inh nasal spray: = 2 spray(s), Nasal, bid, # 16 gm, 5 Refill(s), Type: Maintenance, Pharmacy: [facility], 2 spray(s) Nasal bid.        Visit Information      Date of Service: 2019 10:00 am  Performing Location: [facility]  Encounter#: [id number]      Primary Care Provider (PCP):  Ramirez Cintron MD    NPI# [id number]   Visit type:  New symptom.    Accompanied by:  No one.    Source of history:  Self.    History limitation:  None.       Chief Complaint   2019 10:09 AM CDT   Pt here for \"clicking\"   right knee and nose discomfort        History of Present Illness             The patient presents with rhinorrhea.  The rhinorrhea is from both nostrils.  The rhinorrhea is described as clear.  The severity of the rhinorrhea is severe.  The rhinorrhea is constant and is worsening.  The context of the rhinorrhea: occurred after exposure to allergens.  Exacerbating factors consist of pollen.  Relieving factors consist of allergen avoidance and medication.  Associated symptoms consist of itchy eyes, nasal congestion, sneezing, denies fever, denies headache, denies cough, denies ear pain, " denies facial pain and denies sore throat.               The patient presents with a knee problem.  The location of the knee problem is localized, the right knee, medial aspect.  The knee problem is characterized by popping.  The severity of the knee problem is mild.  The timing/course of symptom(s) associated with the knee problem is episodic.  The knee problem has lasted for an unknown duration of time.  Radiation of pain: none.  occurs when stepping on stair.  Does not cause significant pain.  There are no modifying factors.  Associated symptoms consist of none.        Review of Systems   Constitutional:  Negative.    Eye:  Negative.    Ear/Nose/Mouth/Throat:  Negative except as documented in history of present illness.    Respiratory:  Negative.    Cardiovascular:  Negative.    Gastrointestinal:  Negative.    Genitourinary:  Negative.    Hematology/Lymphatics:  Negative.    Endocrine:  Negative.    Immunologic:  Negative.    Musculoskeletal:  Negative except as documented in history of present illness.    Integumentary:  Negative.    Neurologic:  Negative.    Psychiatric:  Negative.    All other systems reviewed and negative      Health Status   Allergies:    Allergic Reactions (Selected)  No Known Medication Allergies   Medications:  (Selected)   Prescriptions  Prescribed  citalopram 20 mg oral tablet: 2 tab(s) ( 40 mg ), po, daily, # 60 tab(s), 5 Refill(s), Type: Maintenance, Pharmacy: [id number], 2 tab(s) po daily  cyclobenzaprine 10 mg oral tablet: See Instructions, Instructions: TAKE ONE TABLET BY MOUTH EVERY 6 HOURS AS NEEDED FOR SPASM, # 30 tab(s), 5 Refill(s), Type: Soft Stop, Pharmacy: [id number], TAKE ONE TABLET BY MOUTH EVERY 6 HOURS AS NEEDED FOR SPASM  fluticasone 50 mcg/inh nasal spray: = 2 spray(s), Nasal, bid, # 16 gm, 5 Refill(s), Type: Maintenance, Pharmacy: [facility], 2 spray(s) Nasal bid  topiramate 50 mg oral tablet: 2 tab(s) ( 100 mg ), po,  daily, # 60 tab(s), 5 Refill(s), Type: Maintenance, Pharmacy: [id number], Pt due in Sept for office visit, 2 tab(s) po daily  Documented Medications  Documented  Vitamin D3 2000 intl units oral capsule: 1 cap(s) ( 2,000 International Unit ), PO, daily, 0 Refill(s), Type: Soft Stop  melatonin 3 mg oral tablet: 1 tab(s) ( 3 mg ), po, PRN: as needed for insomnia, tab(s), 0 Refill(s), Type: Maintenance   Problem list:    All Problems  HAN (generalized anxiety disorder) / SNOMED CT 60520149 / Confirmed  Insomnia / SNOMED CT 4801553331 / Confirmed  Migraine / SNOMED CT 2048727367 / Confirmed  Moderate persistent asthma / SNOMED CT 8489909572 / Confirmed  Obesity / ICD-9-.00 / Confirmed  Vitamin D Deficiency / ICD-9-.9 / Confirmed      Histories   Past Medical History:    Active  Obesity (278.00)  Insomnia (7993299267)   Family History:    Cancer  Grandmother (M)  Diabetes mellitus  Mother (Naila)  Grandmother (M)  Grandmother (P)  Hypertension  Grandfather (M)  Grandmother (M)  Heart disease  Grandmother (P)  Grandfather (P)     Procedure history:    Ganglion cyst (SNOMED CT 636731413) on 5/23/2000 at 22 Months.  Comments:  4/19/2010 3:59 PM CDT - Farah , Elizabeth  excision rt wrist   Social History:        Alcohol Assessment: Denies Alcohol Use      Tobacco Assessment: Denies Tobacco Use      Employment and Education Assessment            Student      Exercise and Physical Activity Assessment: Occasional exercise      Physical Examination   Vital Signs   5/21/2019 10:09 AM CDT Peripheral Pulse Rate 80 bpm    Systolic Blood Pressure 120 mmHg    Diastolic Blood Pressure 82 mmHg  HI    Mean Arterial Pressure 95 mmHg    Oxygen Saturation 98 %      Measurements from flowsheet : Measurements   5/21/2019 10:09 AM CDT Height Measured - Standard 67.25 in    Weight Measured - Standard 303.6 lb    BSA 2.55 m2    Body Mass Index 47.19 kg/m2  HI      General:  Alert and oriented, Mild distress.    Eye:  Pupils  are equal, round and reactive to light, Extraocular movements are intact, Normal conjunctiva, Vision unchanged.    HENT:  Normocephalic, Normal hearing, Oral mucosa is moist, No pharyngeal erythema, No sinus tenderness.         Ear: Both ears, Within normal limits.         Nose: Both nostrils, Discharge ( Moderate amount, Clear ).         Sinus: Bilateral, Within normal limits.         Mouth: Within normal limits.         Throat: Within normal limits.         Glands: Bilateral, Parotid gland, Submandibular gland, Within normal limits.    Neck:  Supple, Non-tender, No lymphadenopathy, No thyromegaly.    Respiratory:  Lungs are clear to auscultation, Respirations are non-labored, Breath sounds are equal, Symmetrical chest wall expansion.    Cardiovascular:  Normal rate, Regular rhythm, No murmur, No gallop, Good pulses equal in all extremities, Normal peripheral perfusion, No edema.    Gastrointestinal:  Soft, Non-tender, Non-distended, Normal bowel sounds, No organomegaly.    Musculoskeletal:  Normal range of motion, Normal gait.         Lower extremity exam: Knee ( Right, Medial, No deformity, No erythema, No ecchymosis, No effusion, No mass, No nodule, No swelling, No tenderness, No wound, No crepitus, No pain, No numbness, No tingling, Strength  5 /5, Normal range of motion, Abduction or valgus test, Adduction or varus test, Anterior drawer sign, Sunil test, Posterior drawer test ( Negative ) ).    Integumentary:  Warm, Dry, Pink.    Neurologic:  Alert, Oriented.    Psychiatric:  Cooperative, Appropriate mood & affect.

## 2022-02-15 NOTE — PROGRESS NOTES
Patient:   DINO ARORA            MRN: [id number]            FIN: [id number]               Age:   20 years     Sex:  Male     :  2000   Associated Diagnoses:   Diarrhea; Suspected COVID-19 virus infection   Author:   Ramirez Stevenson PA-C      Visit Information      Date of Service: 2020 03:34 pm  Performing Location: [facility]  Encounter#: [id number]      Primary Care Provider (PCP):  Ramirez Cintron MD    NPI# [id number]   Visit type:  Video Visit via [employer] .    Participants in room during visit:  1_   Location of patient:  _home  Location of provider:  _  Home office)  Video Start Time:  _155  Video End Time:   _    Today's visit was conducted via video conference due to the COVID-19 pandemic.  The patient's consent to proceed with a video visit has been obtained and documented.      Chief Complaint   2020 3:38 PM CDT    Concerns for COVID.  Was at a large gathering out of [facility] ([place] -.  Developed a headache on  and GI/diarrhea on 7/15.  Verbal consent for video visit given        History of Present Illness   Patient is a _20 year old _male who is being evaluated via a billable video visit.    Chief complaint and symptoms noted above and confirmed with patient   as above, was in [place] for a large gathering, after he returned he developed a headache and  then yesterday developed diarrhea  took some ibuprofen for the headache, has not taken anything for the diarrhea, is feeling nauseous but no emesis  no known exposure to Covid, no one else at home is sick      Review of Systems   Constitutional:  Negative.    Respiratory:  Cough.    Gastrointestinal:  Nausea, Diarrhea, No vomiting.    Neurologic:  Headache.       Health Status   Allergies:    Allergic Reactions (Selected)  No Known Medication Allergies   Medications:  (Selected)   Prescriptions  Prescribed  citalopram 20 mg oral tablet: 2 tab(s) ( 40 mg ), po, daily, # 60 tab(s), 5 Refill(s), Type: Maintenance,  Pharmacy: [id number], 2 tab(s) po daily  cyclobenzaprine 10 mg oral tablet: See Instructions, Instructions: TAKE ONE TABLET BY MOUTH EVERY 6 HOURS AS NEEDED FOR SPASM, # 30 tab(s), 5 Refill(s), Type: Soft Stop, Pharmacy: [facility], TAKE ONE TABLET BY MOUTH EVERY 6 HOURS AS NEEDED FOR SPASM  fluticasone 50 mcg/inh nasal spray: = 2 spray(s), Nasal, daily, # 16 gm, 2 Refill(s), Type: Maintenance, Pharmacy: [facility], 2 spray(s) Nasal daily, 67.25, in, 05/21/19 10:09:00 CDT, Height Measured, 303.6, lb, 05/21/19 10:09:00 CDT, Weight Measured  topiramate 50 mg oral tablet: 2 tab(s) ( 100 mg ), po, daily, # 60 tab(s), 5 Refill(s), Type: Maintenance, Pharmacy: [id number], Pt due in Sept for office visit, 2 tab(s) po daily  Documented Medications  Documented  Vitamin D3 2000 intl units oral capsule: 1 cap(s) ( 2,000 International Unit ), PO, daily, 0 Refill(s), Type: Soft Stop  ibuprofen 200 mg oral capsule: = 2 cap(s) ( 400 mg ), Oral, q6 hrs, PRN: as needed for pain, 0 Refill(s), Type: Maintenance  melatonin 3 mg oral tablet: 1 tab(s) ( 3 mg ), po, PRN: as needed for insomnia, tab(s), 0 Refill(s), Type: Maintenance   Problem list:    All Problems  Allergic rhinitis / SNOMED CT 631277293 / Confirmed  Migraine / SNOMED CT 2574522914 / Confirmed  Moderate persistent asthma / SNOMED CT 8564631301 / Confirmed  Vitamin D Deficiency / ICD-9-.9 / Confirmed  Obesity / ICD-9-.00 / Confirmed  Insomnia / SNOMED CT 8746735193 / Confirmed  HAN (generalized anxiety disorder) / SNOMED CT 43323610 / Confirmed  Acute medial meniscus tear of right knee / SNOMED CT 632397338 / Confirmed      Histories   Past Medical History:    Active  Obesity (278.00)  Insomnia (5040256265)   Family History:    Cancer  Grandmother (M)  Diabetes mellitus  Mother (Naila)  Grandmother (M)  Grandmother (P)  Hypertension  Grandfather (M)  Grandmother (M)  Heart  disease  Grandmother (P)  Grandfather (P)     Procedure history:    Ganglion cyst (315371724) on 5/23/2000 at 22 Months.  Comments:  4/19/2010 3:59 PM CDT - Farah , Elizabeth  excision rt wrist   Social History:        Alcohol Assessment: Denies Alcohol Use      Tobacco Assessment: Denies Tobacco Use      Employment and Education Assessment            Student      Exercise and Physical Activity Assessment: Occasional exercise        Physical Examination   General:  No acute distress.    Respiratory:  Respirations are non-labored.    Psychiatric:  Cooperative, Appropriate mood & affect, Normal judgment.       Impression and Plan   Diagnosis     Diarrhea (SKM98-BM R19.7).     Suspected COVID-19 virus infection (PUU90-TC R68.89).     Summary:  Patient is referred for [facility] COVID-19 testing and is instructed of the following:  Patient should remain isolated until results of test return and given that tests are not 100% accurate, would be safest to assume that they are contagious with COVID-19 until their symptoms have fully resolved. Isolation is recommended for at least 7 days from the onset of symptoms and for 3 days after resolution of fevers and productive cough. This means patient should not go to work or any public areas. In addition, it is recommended at home that they separate themselves from other people and from animals as much as possible, including using a separate bathroom. If they do need to be around others, a facemask is recommended. Frequent hand hygiene and cleaning of high touch surfaces is also recommended.   Symptoms can last for several weeks. For patients with COVID-19, they can sometimes start to improve and then get worse again. If symptoms worsen at any time, including significant shortness of breath, low oxygen levels, high fevers that cannot be controlled, or concerns for dehydration, they should seek medical care. If going to the ER, calling 911, or seeking care at the clinic, they are  reminded to notify staff that they have been tested for COVID-19.  Patient also is informed that testing will be done in their car at a scheduled time. Test will be sent to an outside commercial lab and billed by that lab. [employer] cannot confirm to patient how billing will be handled by their insurance company.    Patient is also informed that testing for COVID-19 must be reported to the public health department along with contact information for the patient.   Patient information is given to scheduling staff to get patient scheduled for testing. Patient will receive further instructions from scheduling staff.  Patient is encouraged to call back at any time with questions or concerns.  , Will test for Covid but he has not had any known exposure, sxs could be due to gastroenteritis or similar illness, for the diarrhea advised to use Imodium for up to 3 days, push fluids, follow up if not improving.    Orders     Orders   Lab (Gen Lab  Reference Lab):  SARS-CoV-2 RNA (COVID-19), Qualitative NAAT* ([facility]) (Order): Specimen Type: Nasopharyngeal Swab, Collection Date: 7/16/2020 4:05 PM CDT.     Orders   Charges (Evaluation and Management):  16100 office outpatient visit 15 minutes (Charge) (Order): Quantity: 1, Suspected COVID-19 virus infection  Diarrhea.

## 2022-02-16 NOTE — PROGRESS NOTES
Tried to call pt about negative covid results but no answer and mailbox was full so couldn't leave a message. Will try again later.    Valeriot a hold of pt and let him know that his covid results were negative. He had no questions or concerns at this time.    ENE

## 2022-03-02 ENCOUNTER — TRANSFERRED RECORDS (OUTPATIENT)
Dept: HEALTH INFORMATION MANAGEMENT | Facility: CLINIC | Age: 22
End: 2022-03-02

## 2022-03-02 VITALS
HEART RATE: 122 BPM | WEIGHT: 315 LBS | HEIGHT: 67 IN | SYSTOLIC BLOOD PRESSURE: 132 MMHG | DIASTOLIC BLOOD PRESSURE: 80 MMHG | BODY MASS INDEX: 49.44 KG/M2 | TEMPERATURE: 97.9 F

## 2022-03-02 NOTE — TELEPHONE ENCOUNTER
---------------------  From: Rody Marie RN (Phone Messages Pool ([id number]))   To: [facility] Message Pool ([id number] [facility] [place]);     Sent: 2/16/2022 1:47:34 PM CST  Subject: FW: Medication Management   Due Date/Time: 2/16/2022 3:31:00 PM CST     Medication Refill Approval Required    PCP:   N[facility]IA    Medication:   Cyclobenzaprine  Last Filled:  1/13/22   Quantity:  20  Refills:  0    CSA on file?   none     Return to Clinic order placed?  none    Date of last office visit and reason:   1/13/22, low back pain    Date of last labs pertaining to condition:  _    Note:  Please advise on refill, if pt needs appt, etc.    Resource:   _    Phone:   _        ** Patient matched by Rody Marie RN on 2/16/2022 1:39:27 PM CST **      ------------------------------------------  From: [facility]  To: Zachary Lunsford MD  Sent: February 14, 2022 3:31:12 PM CST  Subject: Medication Management  Due: February 9, 2022 2:38:53 AM CST     ** On Hold Pending Signature **     Drug: cyclobenzaprine (cyclobenzaprine 10 mg oral tablet), TAKE ONE TABLET BY MOUTH EVERY 8 HOURS AS NEEDED FOR SPASMS  Quantity: 20 tab(s)  Days Supply: 7  Refills: 0  Substitutions Allowed  Notes from Pharmacy:     Dispensed Drug: cyclobenzaprine (cyclobenzaprine 10 mg oral tablet), TAKE ONE TABLET BY MOUTH EVERY 8 HOURS AS NEEDED FOR SPASMS  Quantity: 20 tab(s)  Days Supply: 7  Refills: 0  Substitutions Allowed  Notes from Pharmacy:  ---------------------------------------------------------------  From: Florida Burton CMA ([facility] ([id number]_[facility]))   To: Zachary Lunsford MD;     Sent: 2/17/2022 8:11:21 AM CST  Subject: FW: Medication Management   Due Date/Time: 2/17/2022 3:31:00 PM CST---------------------  From: Zachary Lunsford MD   To: [facility] [place]    Sent: 2/17/2022 8:51:34 AM CST  Subject: FW: Medication Management     ** Submitted: **  Complete:cyclobenzaprine  (cyclobenzaprine 10 mg oral tablet)   Signed by Zachary Lunsford MD  2/17/2022 2:51:00 PM [facility]    ** Approved with modifications: **  cyclobenzaprine (CYCLOBENZAPRINE HCL 10MG TABS)  TAKE ONE TABLET BY MOUTH EVERY 8 HOURS AS NEEDED FOR SPASMS  Qty:  20 tab(s)        Days Supply:  7        Refills:  0          Substitutions Allowed     Route To Pharmacy - [facility]

## 2022-03-02 NOTE — PROGRESS NOTES
Chief Complaint    Back pain 2-3 weeks. No injury. Did hurt knee last month and is wondering if this has something to do with pain.  History of Present Illness       Patient is here because of back pain.       He had some knee pain last month but that is mostly cleared.  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  There is been no falls or no recall of a specific lift that led to his pain.  It is an aching sensation in the lower back more right-sided than left but it is bilateral.  It hurts primarily if he bends forward.  He has had no fever or chills and no bowel or bladder problems  Review of Systems       See HPI.  All other review of systems negative.  Physical Exam   Vitals & Measurements    T: 97.9  F (Tympanic)  HR: 122 (Peripheral)  BP: 132/80     HT: 67.25 in  WT: 315 lb  BMI: 48.96        Alert noted a BMI of 49       He stands easily normal gait       There is no rashes on his back and no localizing tenderness or spasm       Good range of motion of the hips  Assessment/Plan       1. Back pain (M54.9)          Mechanical low back pain.  We have talked about long-term benefits of improved fitness and would include flexibility and strength of his core muscles he may benefit from seeing a physical therapist.         For the short-term I put him on prednisone 20 a day for 5 days and cyclobenzaprine for acute relief but cautioned him about drowsy side effects  Patient Information     Name:DINO ARORA      Address:      [address] [place] [id number]     Sex:Male     YOB: 2000     [phone]     Emergency Contact:DECLINED, UNKNOWN     MRN:[id number]     FIN:[id number]     Location:[facility]     Date of Service:01/13/2022      Primary Care Physician:       Zachary Lunsford MD, [phone]      Attending Physician:       Zachary Lunsford MD, [phone]  Problem List/Past Medical History    Ongoing     Acute medial meniscus tear of right knee     Allergic  rhinitis     HAN (generalized anxiety disorder)     Insomnia     Migraine     Moderate persistent asthma     Obesity     Vitamin D Deficiency    Historical     No qualifying data  Procedure/Surgical History     Ganglion cyst (05/23/2000)      Comments: excision rt wrist.  Medications    citalopram 20 mg oral tablet, 40 mg= 2 tab(s), Oral, daily, 5 refills    cyclobenzaprine 10 mg oral tablet, 10 mg= 1 tab(s), Oral, q8 hrs, PRN    cyclobenzaprine 10 mg oral tablet, See Instructions, 5 refills    fluticasone 50 mcg/inh nasal spray, 2 spray(s), Nasal, daily, 1 refills    ibuprofen 200 mg oral capsule, 400 mg= 2 cap(s), Oral, q6 hrs, PRN    melatonin 3 mg oral tablet, 3 mg= 1 tab(s), Oral, PRN    predniSONE 20 mg oral tablet, 20 mg= 1 tab(s), Oral, daily    topiramate 50 mg oral tablet, 100 mg= 2 tab(s), Oral, daily, 5 refills    ZyrTEC 10 mg oral tablet, 10 mg= 1 tab(s), Oral, daily  Allergies    No Known Medication Allergies  Social History    Smoking Status     Never smoker     Alcohol - Denies Alcohol Use     Electronic Cigarette/Vaping      Electronic Cigarette Use: Never.     Employment/School      Student     Exercise - Occasional exercise     Tobacco      Never (less than 100 in lifetime)  Family History    Cancer: Grandmother (M).    Diabetes mellitus: Mother, Grandmother (M) and Grandmother (P).    Heart disease: Grandfather (P) and Grandmother (P).    Hypertension: Grandfather (M) and Grandmother (M).  Immunizations          Scheduled Immunizations          Dose Date(s)          Hep B          2000, 2000, 01/12/1999          human papillomavirus vaccine          08/19/2014, 12/02/2014, 12/24/2015          influenza virus vaccine, inactivated          12/02/2014, 12/24/2015          IPV          2000, 2000, 10/05/1999, 07/02/2003          meningococcal conjugate vaccine          08/19/2014          MMR (measles/mumps/rubella)          07/06/1999, 07/02/2003          SARS-CoV-2  (COVID-19) Pfizer-162b2          03/10/2021, 03/31/2021          Td          06/30/2009          varicella          07/06/1999, 06/30/1999, 06/30/2009          Other Immunizations          influenza (LAIV)          10/20/2011, 10/08/2012          Hib (HbOC)          1998, 01/12/1999, 01/12/1999, 10/05/1999          tetanus/diphth/pertuss (Tdap) adult/adol          01/13/2021          DTaP          2000, 1998, 01/12/1999, 10/05/1999, 07/02/2003

## 2022-03-02 NOTE — NURSING NOTE
Comprehensive Intake Entered On:  1/13/2022 3:54 PM CST    Performed On:  1/13/2022 3:49 PM CST by Ayse Santos               Summary   Chief Complaint :   Back pain 2-3 weeks. No injury. Did hurt knee last month and is wondering if this has something to do with pain.    Menstrual Status :   N/A   Weight Measured :   315 lb(Converted to: 315 lb 0 oz, 142.882 kg)    Height Measured :   67.25 in(Converted to: 5 ft 7 in, 170.81 cm)    Body Mass Index :   48.96 kg/m2 (HI)    Body Surface Area :   2.6 m2   Systolic Blood Pressure :   132 mmHg (HI)    Diastolic Blood Pressure :   80 mmHg   Mean Arterial Pressure :   97 mmHg   Peripheral Pulse Rate :   122 bpm (HI)    Temperature Tympanic :   97.9 DegF(Converted to: 36.6 DegC)    Ayse Santos - 1/13/2022 3:49 PM CST   Health Status   Allergies Verified? :   Yes   Medication History Verified? :   Yes   Immunizations Current :   Yes   Medical History Verified? :   Yes   Pre-Visit Planning Status :   Completed   Tobacco Use? :   Never smoker   Ayse Santos - 1/13/2022 3:49 PM CST   Meds / Allergies   (As Of: 1/13/2022 3:54:41 PM CST)   Allergies (Active)   No Known Medication Allergies  Estimated Onset Date:   Unspecified ; Created By:   Kiko Layne CMA; Reaction Status:   Active ; Category:   Drug ; Substance:   No Known Medication Allergies ; Type:   Allergy ; Updated By:   Kiko Layne CMA; Reviewed Date:   1/13/2022 3:54 PM CST        Medication List   (As Of: 1/13/2022 3:54:41 PM CST)   Prescription/Discharge Order    citalopram  :   citalopram ; Status:   Prescribed ; Ordered As Mnemonic:   citalopram 20 mg oral tablet ; Simple Display Line:   40 mg, 2 tab(s), po, daily, 60 tab(s), 5 Refill(s) ; Ordering Provider:   Ramirez Cintron MD; Catalog Code:   citalopram ; Order Dt/Tm:   1/8/2018 2:58:22 PM CST          cyclobenzaprine  :   cyclobenzaprine ; Status:   Prescribed ; Ordered As Mnemonic:   cyclobenzaprine 10 mg oral tablet ; Simple  Display Line:   See Instructions, TAKE ONE TABLET BY MOUTH EVERY 6 HOURS AS NEEDED FOR SPASM, 30 tab(s), 5 Refill(s) ; Ordering Provider:   Ramirez Cintron MD; Catalog Code:   cyclobenzaprine ; Order Dt/Tm:   6/27/2019 12:57:21 PM CDT          fluticasone nasal  :   fluticasone nasal ; Status:   Prescribed ; Ordered As Mnemonic:   fluticasone 50 mcg/inh nasal spray ; Simple Display Line:   2 spray(s), Nasal, daily, 16 gm, 1 Refill(s) ; Ordering Provider:   Ramirez Cintron MD; Catalog Code:   fluticasone nasal ; Order Dt/Tm:   11/24/2020 10:18:41 AM CST          topiramate  :   topiramate ; Status:   Prescribed ; Ordered As Mnemonic:   topiramate 50 mg oral tablet ; Simple Display Line:   100 mg, 2 tab(s), po, daily, 60 tab(s), 5 Refill(s) ; Ordering Provider:   Ramirez Cintron MD; Catalog Code:   topiramate ; Order Dt/Tm:   1/8/2018 2:58:36 PM CST            Home Meds    cetirizine  :   cetirizine ; Status:   Documented ; Ordered As Mnemonic:   ZyrTEC 10 mg oral tablet ; Simple Display Line:   10 mg, 1 tab(s), Oral, daily, 0 Refill(s) ; Catalog Code:   cetirizine ; Order Dt/Tm:   12/23/2021 3:31:29 PM CST          ibuprofen  :   ibuprofen ; Status:   Documented ; Ordered As Mnemonic:   ibuprofen 200 mg oral capsule ; Simple Display Line:   400 mg, 2 cap(s), Oral, q6 hrs, PRN: as needed for pain, 0 Refill(s) ; Catalog Code:   ibuprofen ; Order Dt/Tm:   6/23/2020 2:49:29 PM CDT          melatonin  :   melatonin ; Status:   Documented ; Ordered As Mnemonic:   melatonin 3 mg oral tablet ; Simple Display Line:   3 mg, 1 tab(s), po, tab(s), PRN: as needed for insomnia ; Catalog Code:   melatonin ; Order Dt/Tm:   5/20/2011 3:01:30 PM CDT            Social History   Social History   (As Of: 1/13/2022 3:54:41 PM CST)   Alcohol:  Denies Alcohol Use      (Last Updated: 4/19/2010 4:03:19 PM CDT by Elizabeth Farah CMA )         Tobacco:        Never (less than 100 in lifetime)   (Last Updated: 1/13/2022 3:49:47 PM CST by Tom  Ayse)          Electronic Cigarette/Vaping:        Electronic Cigarette Use: Never.   (Last Updated: 1/13/2022 3:49:50 PM CST by Ayse Santos)          Employment/School:        Student   (Last Updated: 3/21/2012 12:50:20 PM CDT by Kym Camp)          Exercise:  Occasional exercise      (Last Updated: 4/19/2010 4:03:30 PM CDT by Elizabeth Farah CMA )

## 2022-04-23 ENCOUNTER — OFFICE VISIT (OUTPATIENT)
Dept: URGENT CARE | Facility: URGENT CARE | Age: 22
End: 2022-04-23
Payer: COMMERCIAL

## 2022-04-23 VITALS
TEMPERATURE: 97.6 F | OXYGEN SATURATION: 98 % | HEART RATE: 74 BPM | BODY MASS INDEX: 50.32 KG/M2 | WEIGHT: 315 LBS | SYSTOLIC BLOOD PRESSURE: 124 MMHG | DIASTOLIC BLOOD PRESSURE: 84 MMHG

## 2022-04-23 DIAGNOSIS — M62.838 MUSCLE SPASM: Primary | ICD-10-CM

## 2022-04-23 PROBLEM — F41.1 GENERALIZED ANXIETY DISORDER: Status: ACTIVE | Noted: 2022-04-23

## 2022-04-23 PROBLEM — G43.009 MIGRAINE WITHOUT AURA AND RESPONSIVE TO TREATMENT: Status: ACTIVE | Noted: 2022-04-23

## 2022-04-23 PROBLEM — J30.9 ALLERGIC RHINITIS: Status: ACTIVE | Noted: 2022-04-23

## 2022-04-23 PROBLEM — J45.40 MODERATE PERSISTENT ASTHMA: Status: ACTIVE | Noted: 2022-04-23

## 2022-04-23 PROBLEM — E66.9 OBESITY: Status: ACTIVE | Noted: 2022-04-23

## 2022-04-23 PROBLEM — E55.9 VITAMIN D DEFICIENCY: Status: ACTIVE | Noted: 2022-04-23

## 2022-04-23 PROBLEM — E66.01 MORBID OBESITY (H): Status: ACTIVE | Noted: 2022-04-23

## 2022-04-23 PROBLEM — F51.01 PRIMARY INSOMNIA: Status: ACTIVE | Noted: 2022-04-23

## 2022-04-23 PROCEDURE — 99213 OFFICE O/P EST LOW 20 MIN: CPT | Performed by: PHYSICIAN ASSISTANT

## 2022-04-23 RX ORDER — CETIRIZINE HYDROCHLORIDE 10 MG/1
10 TABLET ORAL DAILY
COMMUNITY
Start: 2021-12-23

## 2022-04-23 RX ORDER — LANOLIN ALCOHOL/MO/W.PET/CERES
3 CREAM (GRAM) TOPICAL AT BEDTIME
COMMUNITY

## 2022-04-23 RX ORDER — TOPIRAMATE 100 MG/1
100 TABLET, FILM COATED ORAL AT BEDTIME
COMMUNITY
Start: 2022-03-28 | End: 2023-04-11

## 2022-04-23 RX ORDER — CITALOPRAM HYDROBROMIDE 20 MG/1
2 TABLET ORAL DAILY
COMMUNITY
Start: 2022-04-10

## 2022-04-23 RX ORDER — FLUTICASONE PROPIONATE 50 MCG
2 SPRAY, SUSPENSION (ML) NASAL DAILY
COMMUNITY
Start: 2020-11-24

## 2022-04-23 RX ORDER — METHOCARBAMOL 500 MG/1
500-1000 TABLET, FILM COATED ORAL 3 TIMES DAILY
Qty: 60 TABLET | Refills: 0 | Status: SHIPPED | OUTPATIENT
Start: 2022-04-23 | End: 2023-04-11

## 2022-04-23 RX ORDER — CYCLOBENZAPRINE HCL 10 MG
1 TABLET ORAL EVERY 8 HOURS PRN
COMMUNITY
Start: 2022-02-17 | End: 2022-05-19

## 2022-04-23 NOTE — PROGRESS NOTES
Assessment & Plan     Muscle spasm  Trial of heat, ice, ibuprofen or tylenol for comfort. May use robaxin for spasm. If any persistent or worsening sx should follow-up with pcp.  No weakness, fevers or trauma thus not likely associated with fx, infectious process or neurological in etiology.  Pt agreeable with plan.      - methocarbamol (ROBAXIN) 500 MG tablet  Dispense: 60 tablet; Refill: 0     [facility] [place]    Dianna Bustos is a 21 year old male who presents to clinic today for the following health issues:  Chief Complaint   Patient presents with     Musculoskeletal Problem     Muscle spasms in arms and back started beginning of the week     HPI    Pt presents with 4-5 day hx of upper shoulders and upper back musclar pain. Feels tight and spasms of pain.  Shooting discomfort down the arms B. No T/N/Weakness.  Has had back problems in the past and using flexeril the last several months and that is helpful for back pain but spasm seems worse now that he is out of the medication.    Has not tried other conservative measures.    Difficulty working due to spasms and work requirements.      Review of Systems  Constitutional, HEENT, cardiovascular, pulmonary, gi and gu systems are negative, except as otherwise noted.      Objective    /84 (BP Location: Right arm, Patient Position: Sitting)   Pulse 74   Temp 97.6  F (36.4  C)   Wt 146.8 kg (323 lb 11.2 oz)   SpO2 98%   BMI 50.32 kg/m    Physical Exam   nad appears well  No defomrity noted.  No midline C, T, L spine discomfort. Mild upper trap discomfort B  Full nonpainful AROM in flexion, extension, lateral flexion and rotation at the C spine B.    muscluar strength, sensation and DTR are symetrical and WNL for upper ext B.

## 2022-04-23 NOTE — PATIENT INSTRUCTIONS
Slowly wean off the muscle relaxants.    Ice, heat, ibuprofen or ytlenol for pain.    Follow up with primary care provider for persistent symptoms or worsening.

## 2022-05-19 ENCOUNTER — OFFICE VISIT (OUTPATIENT)
Dept: FAMILY MEDICINE | Facility: CLINIC | Age: 22
End: 2022-05-19
Payer: COMMERCIAL

## 2022-05-19 VITALS
BODY MASS INDEX: 46.65 KG/M2 | HEIGHT: 69 IN | SYSTOLIC BLOOD PRESSURE: 123 MMHG | HEART RATE: 79 BPM | DIASTOLIC BLOOD PRESSURE: 76 MMHG | WEIGHT: 315 LBS | TEMPERATURE: 98.7 F

## 2022-05-19 DIAGNOSIS — S09.90XA INJURY OF HEAD, INITIAL ENCOUNTER: Primary | ICD-10-CM

## 2022-05-19 PROBLEM — S83.249A ACUTE MEDIAL MENISCAL TEAR: Status: ACTIVE | Noted: 2022-05-19

## 2022-05-19 PROCEDURE — 99213 OFFICE O/P EST LOW 20 MIN: CPT | Performed by: INTERNAL MEDICINE

## 2022-05-19 RX ORDER — IBUPROFEN 200 MG
200 TABLET ORAL EVERY 4 HOURS PRN
COMMUNITY

## 2022-05-19 ASSESSMENT — ENCOUNTER SYMPTOMS
HEADACHES: 1
VOMITING: 0
NAUSEA: 0
PARESTHESIAS: 0
WEAKNESS: 0
LIGHT-HEADEDNESS: 1

## 2022-05-19 NOTE — PROGRESS NOTES
"  Assessment & Plan     Injury of head, initial encounter  Recurrent head injury with mild associated symptoms.  Patient knows head injury protocol and is following it.  This is lingering longer than usual.  - CT Head w/o Contrast; Future  - Adult Neurology  Referral; Future                  No follow-ups on file.    David Mendieta MD  [facility]    Dianna Bustos is a 21 year old who presents for the following health issues     History of Present Illness       Reason for visit:  Hit my head  Symptom onset:  3-7 days ago    He eats 0-1 servings of fruits and vegetables daily.He consumes 3 sweetened beverage(s) daily.He exercises with enough effort to increase his heart rate 10 to 19 minutes per day.  He exercises with enough effort to increase his heart rate 7 days per week.   He is taking medications regularly.     Patient with a history of head injury last episode 2014 presents after hitting his head on a trailer door 5/15/2022.  He was not knocked out.  He is requiring instance crease sleep.  He notes decreased processing speed.  Diffuse mild headache without nausea.  Mild lightheaded and dizzy feelings.  No vomiting.  No other neurologic symptoms.  He denies snoring, daytime sleepiness, sleep apnea.        Review of Systems   Eyes: Negative for visual disturbance.   Gastrointestinal: Negative for nausea and vomiting.   Neurological: Positive for light-headedness and headaches. Negative for weakness and paresthesias.            Objective    /76 (BP Location: Right arm)   Pulse 79   Temp 98.7  F (37.1  C)   Ht 1.74 m (5' 8.5\")   Wt 144.5 kg (318 lb 8 oz)   BMI 47.72 kg/m    Body mass index is 47.72 kg/m .  Physical Exam   Patient is a morbidly obese young man in no distress.  Affect normal.  HEENT exam is atraumatic, crowded oropharynx.  Eyes appear normal.  Pupils equal and reactive.  Extraocular muscles intact.  Neck without adenopathy or thyromegaly.  No " tenderness.  Range of motion of the neck normal.  Patient is alert and oriented.  Speech is fluent.  Memory is intact.  Cranial nerves are normal.  Strength and gait normal.

## 2022-05-25 ENCOUNTER — TELEPHONE (OUTPATIENT)
Dept: FAMILY MEDICINE | Facility: CLINIC | Age: 22
End: 2022-05-25
Payer: COMMERCIAL

## 2022-05-25 NOTE — TELEPHONE ENCOUNTER
SLY OUT OF CLINIC-FORWARDED TO YAKELIN SMALLWOOD.    Reason for Call:  Request for results:    Name of test or procedure: CT scan of Head    Date of test of procedure: 05.20    Location of the test or procedure: [facility]    OK to leave the result message on voice mail or with a family member? YES    Phone number Patient can be reached at:  Cell number on file:    Telephone Information:   Mobile [phone]       Additional comments: Patient gave verbal permission on phone to speak to his mother regarding this matter.    Call taken on 5/25/2022 at 3:54 PM by JOYCELYN RHODES

## 2022-05-25 NOTE — PROGRESS NOTES
Patient was notified of result. Will follow up with Neurology  Answers for HPI/ROS submitted by the patient on 5/19/2022  How many servings of fruits and vegetables do you eat daily?: 0-1  On average, how many sweetened beverages do you drink each day (Examples: soda, juice, sweet tea, etc.  Do NOT count diet or artificially sweetened beverages)?: 3  How many minutes a day do you exercise enough to make your heart beat faster?: 10 to 19  How many days a week do you exercise enough to make your heart beat faster?: 7  How many days per week do you miss taking your medication?: 0  What is the reason for your visit today?: Hit my head  When did your symptoms begin?: 3-7 days ago

## 2022-11-03 ENCOUNTER — TELEPHONE (OUTPATIENT)
Dept: FAMILY MEDICINE | Facility: CLINIC | Age: 22
End: 2022-11-03

## 2022-11-03 ENCOUNTER — TELEPHONE (OUTPATIENT)
Dept: NURSING | Facility: CLINIC | Age: 22
End: 2022-11-03

## 2022-11-03 NOTE — TELEPHONE ENCOUNTER
No consent to communicate on file for mother and patient is not with his mother.    Mother will call back when patient is present.    Cesia Mead RN on 11/3/2022 at 3:05 PM

## 2022-11-03 NOTE — TELEPHONE ENCOUNTER
11-3-22  Mom called stated shes home now w/ Akash (since previous msg states pt has to  Be present )  Mom states akash has had a off & on headache x2 weeks that comes and goes.  I stated [facility] no openings till 11-8-22, otherwise [facility] are open 730 am to 9pm mom states she will take pt there or walk in care [facility] on 11-5-22  conchita

## 2022-11-03 NOTE — TELEPHONE ENCOUNTER
[facility] at [facility] patient's mother called regarding ongoing HA patient is experiencing. Requested a callback to patient's mom, Naila at [phone]. Patient is over 18 and no consent on file to speak to parent.    Called patient directly at phone number listed in chart to further access HA symptoms. Patient did not answer. Left voice message requesting a callback to assess HA.    Patient had a Head Injury in May, CT scan was negative, patient was to follow up with neurology if symptoms persisted, nothing noted in chart regarding follow up. Patient also has problem listed as migraines.

## 2023-02-22 ENCOUNTER — OFFICE VISIT (OUTPATIENT)
Dept: FAMILY MEDICINE | Facility: CLINIC | Age: 23
End: 2023-02-22
Payer: COMMERCIAL

## 2023-02-22 VITALS
HEART RATE: 66 BPM | WEIGHT: 315 LBS | HEIGHT: 69 IN | BODY MASS INDEX: 46.65 KG/M2 | SYSTOLIC BLOOD PRESSURE: 118 MMHG | OXYGEN SATURATION: 98 % | RESPIRATION RATE: 18 BRPM | TEMPERATURE: 97.7 F | DIASTOLIC BLOOD PRESSURE: 76 MMHG

## 2023-02-22 DIAGNOSIS — G43.009 MIGRAINE WITHOUT AURA AND RESPONSIVE TO TREATMENT: Primary | ICD-10-CM

## 2023-02-22 DIAGNOSIS — R47.89 WORD FINDING PROBLEM: ICD-10-CM

## 2023-02-22 DIAGNOSIS — M26.609 TMJ (TEMPOROMANDIBULAR JOINT SYNDROME): ICD-10-CM

## 2023-02-22 PROCEDURE — 99214 OFFICE O/P EST MOD 30 MIN: CPT | Performed by: PHYSICIAN ASSISTANT

## 2023-02-22 RX ORDER — TOPIRAMATE 100 MG/1
100 TABLET, FILM COATED ORAL 2 TIMES DAILY
Qty: 180 TABLET | Refills: 1 | Status: SHIPPED | OUTPATIENT
Start: 2023-02-22 | End: 2023-09-06

## 2023-02-22 RX ORDER — CLONAZEPAM 0.5 MG/1
TABLET ORAL
COMMUNITY
Start: 2022-11-28 | End: 2023-02-22

## 2023-02-22 ASSESSMENT — ASTHMA QUESTIONNAIRES
QUESTION_4 LAST FOUR WEEKS HOW OFTEN HAVE YOU USED YOUR RESCUE INHALER OR NEBULIZER MEDICATION (SUCH AS ALBUTEROL): NOT AT ALL
QUESTION_2 LAST FOUR WEEKS HOW OFTEN HAVE YOU HAD SHORTNESS OF BREATH: ONCE OR TWICE A WEEK
QUESTION_1 LAST FOUR WEEKS HOW MUCH OF THE TIME DID YOUR ASTHMA KEEP YOU FROM GETTING AS MUCH DONE AT WORK, SCHOOL OR AT HOME: NONE OF THE TIME
QUESTION_3 LAST FOUR WEEKS HOW OFTEN DID YOUR ASTHMA SYMPTOMS (WHEEZING, COUGHING, SHORTNESS OF BREATH, CHEST TIGHTNESS OR PAIN) WAKE YOU UP AT NIGHT OR EARLIER THAN USUAL IN THE MORNING: NOT AT ALL
QUESTION_5 LAST FOUR WEEKS HOW WOULD YOU RATE YOUR ASTHMA CONTROL: COMPLETELY CONTROLLED
ACT_TOTALSCORE: 24
ACT_TOTALSCORE: 24

## 2023-02-22 ASSESSMENT — ENCOUNTER SYMPTOMS
NECK STIFFNESS: 0
NUMBNESS: 0
SPEECH DIFFICULTY: 1
MYALGIAS: 0
SEIZURES: 0
HEADACHES: 1
NECK PAIN: 0
FEVER: 0
ARTHRALGIAS: 0
WEAKNESS: 0
DIZZINESS: 1

## 2023-02-22 NOTE — PROGRESS NOTES
Assessment & Plan     Migraine without aura and responsive to treatment, word finding problem, TMJ  Patient with long history of headaches, newer onset TMJ and now word delivery delays in the last 1.5 months. No additional neurology concerns at this time. No focal findings on neurological exam. Given recurrent nature of patients headaches, will increase topamax dose to 100 mg twice daily instead of once daily. Also referring patient to neurology for recommendations given more frequent headaches and new word difficulties. For TMJ there is evidence that physical therapy may be beneficial to ease pain and prevent further headache occurrences. Patient advised to follow up with neurology and us as needed.    - Physical Therapy Referral  - topiramate (TOPAMAX) 100 MG tablet  Dispense: 180 tablet; Refill: 1      No follow-ups on file.  Note composed by VIRY German. History and exam performed and confirmed by me. Agree with assessment and plan.    ELMA Triana  [facility]    Dianna Hernandez is a 22 year old accompanied by his mother, presenting for the following health issues:  Headache  Patient reports history of ongoing migraine headaches for long time. This fall he has been dealing with TMJ issues more on the left than the right which has seemed to make his headaches worse and more frequent. His headaches do seem to impact the left side more than the right. He has been dealing with daily headaches now for about 1.5 months.     He also feels that his headaches in general have been causing more issues in speech and delayed brain function, ongoing on and off for the last 2 months. He describes issues with stringing words together, experiencing delays. Bright lights and loud noises make the headaches worse.     He has seen the dentist in November for TMJ and was told that the joint is stable but muscles surrounding may be affected. The patient was given muscle relaxer but this  "made him dizzy. Patient is on topamax and occasionally supplements with NSAIDs which has helped a little.     Mother reports neurology wanted the patient to see PCP prior to going back to neurology. He has seen neurology before in the past and they have adjusted migraine medication levels.     There is no confusion, weakness, vision changes, numbness or other concerning features.     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     There is no family history of neurological conditions that is known.     The patient drinks water and has been trying to reduce stress    He has not been on any new medications.     History of Present Illness       Headaches:   Since the patient's last clinic visit, headaches are: worsened  The patient is getting headaches:  Daily  He is able to do normal daily activities when he has a migraine.  The patient is taking the following rescue/relief medications:  Ibuprofen (Advil, Motrin)   Patient states \"I get only a small amount of relief\" from the rescue/relief medications.   The patient is taking the following medications to prevent migraines:  Topomax  In the past 4 weeks, the patient has gone to an Urgent Care or Emergency Room 0 times times due to headaches.    He eats 0-1 servings of fruits and vegetables daily.He consumes 2 sweetened beverage(s) daily.He exercises with enough effort to increase his heart rate 20 to 29 minutes per day.  He exercises with enough effort to increase his heart rate 7 days per week. He is missing 1 dose(s) of medications per week.         Review of Systems   Constitutional: Negative for fever.   HENT: Positive for congestion (around animals).    Eyes: Negative for visual disturbance.   Musculoskeletal: Negative for arthralgias, myalgias, neck pain and neck stiffness.   Neurological: Positive for dizziness (chronic), speech difficulty and " "headaches. Negative for seizures, syncope, weakness and numbness.          Objective    /76   Pulse 66   Temp 97.7  F (36.5  C)   Resp 18   Ht 1.74 m (5' 8.5\")   Wt 147.9 kg (326 lb)   SpO2 98%   BMI 48.85 kg/m    Body mass index is 48.85 kg/m .  Physical Exam  Vitals and nursing note reviewed.   Constitutional:       General: He is not in acute distress.  HENT:      Head: Normocephalic and atraumatic.        Comments: TTP over the left jaw line.      Right Ear: Tympanic membrane, ear canal and external ear normal.      Left Ear: Tympanic membrane, ear canal and external ear normal.      Nose: Nose normal.      Mouth/Throat:      Mouth: Mucous membranes are moist.      Pharynx: Oropharynx is clear.   Eyes:      Extraocular Movements: Extraocular movements intact.      Pupils: Pupils are equal, round, and reactive to light.   Cardiovascular:      Rate and Rhythm: Normal rate and regular rhythm.      Heart sounds: No murmur heard.    No friction rub. No gallop.   Pulmonary:      Effort: Pulmonary effort is normal. No respiratory distress.      Breath sounds: No wheezing, rhonchi or rales.   Musculoskeletal:      Cervical back: Normal range of motion. No rigidity or tenderness.   Neurological:      Mental Status: He is alert.      Cranial Nerves: Cranial nerves 2-12 are intact. No cranial nerve deficit or facial asymmetry.      Sensory: Sensation is intact.      Motor: No weakness.      Coordination: Romberg sign negative.      Gait: Gait is intact. Gait and tandem walk normal.                    "

## 2023-03-14 ENCOUNTER — TRANSFERRED RECORDS (OUTPATIENT)
Dept: HEALTH INFORMATION MANAGEMENT | Facility: CLINIC | Age: 23
End: 2023-03-14
Payer: COMMERCIAL

## 2023-04-11 ENCOUNTER — OFFICE VISIT (OUTPATIENT)
Dept: FAMILY MEDICINE | Facility: CLINIC | Age: 23
End: 2023-04-11
Payer: COMMERCIAL

## 2023-04-11 VITALS
HEART RATE: 86 BPM | WEIGHT: 315 LBS | SYSTOLIC BLOOD PRESSURE: 108 MMHG | DIASTOLIC BLOOD PRESSURE: 66 MMHG | TEMPERATURE: 98.3 F | HEIGHT: 69 IN | BODY MASS INDEX: 46.65 KG/M2 | OXYGEN SATURATION: 96 %

## 2023-04-11 DIAGNOSIS — J02.0 STREPTOCOCCAL SORE THROAT: Primary | ICD-10-CM

## 2023-04-11 LAB — DEPRECATED S PYO AG THROAT QL EIA: NEGATIVE

## 2023-04-11 PROCEDURE — U0003 INFECTIOUS AGENT DETECTION BY NUCLEIC ACID (DNA OR RNA); SEVERE ACUTE RESPIRATORY SYNDROME CORONAVIRUS 2 (SARS-COV-2) (CORONAVIRUS DISEASE [COVID-19]), AMPLIFIED PROBE TECHNIQUE, MAKING USE OF HIGH THROUGHPUT TECHNOLOGIES AS DESCRIBED BY CMS-2020-01-R: HCPCS | Performed by: FAMILY MEDICINE

## 2023-04-11 PROCEDURE — 99213 OFFICE O/P EST LOW 20 MIN: CPT | Mod: CS | Performed by: FAMILY MEDICINE

## 2023-04-11 PROCEDURE — U0005 INFEC AGEN DETEC AMPLI PROBE: HCPCS | Performed by: FAMILY MEDICINE

## 2023-04-11 PROCEDURE — 87651 STREP A DNA AMP PROBE: CPT | Performed by: FAMILY MEDICINE

## 2023-04-11 RX ORDER — GALCANEZUMAB 120 MG/ML
INJECTION, SOLUTION SUBCUTANEOUS
COMMUNITY
Start: 2023-03-23

## 2023-04-11 ASSESSMENT — ENCOUNTER SYMPTOMS
COUGH: 1
SORE THROAT: 1

## 2023-04-11 NOTE — PROGRESS NOTES
"  Assessment & Plan     sore throat  Rapid strep is negative.  We will continue with symptomatic care with acetaminophen or ibuprofen as needed.  If symptoms worsen you need to follow-up.  Await confirmatory strep and COVID 19 testing.  - Streptococcus A Rapid Screen w/Reflex to PCR  - Symptomatic COVID-19 Virus (Coronavirus) by PCR Nose  - Group A Streptococcus PCR Throat Swab             BMI:   Estimated body mass index is 48.25 kg/m  as calculated from the following:    Height as of this encounter: 1.74 m (5' 8.5\").    Weight as of this encounter: 146.1 kg (322 lb).           Maurice Bullock MD  [facility]    Dianna Hernandez is a 22 year old, presenting for the following health issues: sore throat, cough, runny nose, started Friday or Saturday, no known fever, reports history of allergies that cause similar symptoms  Pharyngitis and Cough        4/11/2023     4:33 PM   Additional Questions   Accompanied by NO ONE     Pharyngitis   Associated symptoms include cough.   Cough  Associated symptoms include sore throat.   History of Present Illness       Reason for visit:  Sore throat  Symptom onset:  3-7 days ago  Symptom intensity:  Severe  Symptom progression:  Staying the same  Had these symptoms before:  Yes  Has tried/received treatment for these symptoms:  Yes  Previous treatment was successful:  Yes    He eats 0-1 servings of fruits and vegetables daily.He consumes 2 sweetened beverage(s) daily.He exercises with enough effort to increase his heart rate 20 to 29 minutes per day.  He exercises with enough effort to increase his heart rate 7 days per week.   He is taking medications regularly.     He denies recent infectious exposures.  No history of fever.      Review of Systems   HENT: Positive for sore throat.    Respiratory: Positive for cough.             Objective    /66 (BP Location: Right arm, Patient Position: Sitting)   Pulse 86   Temp 98.3  F (36.8  C)   Ht 1.74 " "m (5' 8.5\")   Wt 146.1 kg (322 lb)   SpO2 96%   BMI 48.25 kg/m    Body mass index is 48.25 kg/m .  Physical Exam   General:  Alert and oriented, No acute distress.     HENT:  Normocephalic, Tympanic membranes are clear, Normal hearing, Oral mucosa is moist.          Throat: Tonsils ( Within normal limits ), Pharynx ( Not edematous, Erythematous, No exudate ).     Neck:  anterior cervical adenopathy.     Respiratory:  Lungs are clear to auscultation, Respirations are non-labored.     Cardiovascular:  Normal rate, Regular rhythm.     Integumentary:  Warm, Dry, No rash.                     "

## 2023-04-12 LAB
GROUP A STREP BY PCR: NOT DETECTED
SARS-COV-2 RNA RESP QL NAA+PROBE: NEGATIVE

## 2023-05-20 ENCOUNTER — HEALTH MAINTENANCE LETTER (OUTPATIENT)
Age: 23
End: 2023-05-20

## 2023-06-16 DIAGNOSIS — G43.009 MIGRAINE WITHOUT AURA AND RESPONSIVE TO TREATMENT: ICD-10-CM

## 2023-06-16 RX ORDER — TOPIRAMATE 100 MG/1
TABLET, FILM COATED ORAL
Qty: 180 TABLET | Refills: 1 | OUTPATIENT
Start: 2023-06-16

## 2023-06-16 NOTE — TELEPHONE ENCOUNTER
"TC- please contact patient and assist with scheduling follow up appointment and medication management.   Patient has refill at pharmacy through August.    Last office visit: 4/11/2023   Last RX: 2/22/2023  #180  R-1  ( 6 month supply)    Future Appointments 6/16/2023 - 12/13/2023    None          Requested Prescriptions   Pending Prescriptions Disp Refills     topiramate (TOPAMAX) 100 MG tablet [Pharmacy Med Name: TOPIRAMATE 100MG TABS] 180 tablet 1     Sig: TAKE ONE TABLET BY MOUTH TWICE A DAY       Anti-Seizure Meds Protocol  Failed - 6/16/2023  1:21 PM        Failed - Review Authorizing provider's last note.      Refer to last progress notes: confirm request is for original authorizing provider (cannot be through other providers).          Failed - Normal CBC on file in past 26 months     No lab results found.              Failed - Normal ALT or AST on file in past 26 months     No lab results found.  No lab results found.          Failed - Normal platelet count on file in past 26 months     No lab results found.            Passed - Recent (12 mo) or future (30 days) visit within the authorizing provider's specialty     Patient has had an office visit with the authorizing provider or a provider within the authorizing providers department within the previous 12 mos or has a future within next 30 days. See \"Patient Info\" tab in inbasket, or \"Choose Columns\" in Meds & Orders section of the refill encounter.              Passed - Medication is active on med list                   "

## 2023-06-29 ENCOUNTER — TRANSFERRED RECORDS (OUTPATIENT)
Dept: HEALTH INFORMATION MANAGEMENT | Facility: CLINIC | Age: 23
End: 2023-06-29
Payer: COMMERCIAL

## 2023-07-15 ENCOUNTER — OFFICE VISIT (OUTPATIENT)
Dept: URGENT CARE | Facility: URGENT CARE | Age: 23
End: 2023-07-15
Payer: COMMERCIAL

## 2023-07-15 VITALS
SYSTOLIC BLOOD PRESSURE: 117 MMHG | BODY MASS INDEX: 47.95 KG/M2 | OXYGEN SATURATION: 96 % | HEART RATE: 67 BPM | DIASTOLIC BLOOD PRESSURE: 78 MMHG | RESPIRATION RATE: 18 BRPM | WEIGHT: 315 LBS | TEMPERATURE: 97.7 F

## 2023-07-15 DIAGNOSIS — L08.9 INFECTED PUNCTURE WOUND OF PLANTAR ASPECT OF FOOT, RIGHT, INITIAL ENCOUNTER: Primary | ICD-10-CM

## 2023-07-15 DIAGNOSIS — S91.331A INFECTED PUNCTURE WOUND OF PLANTAR ASPECT OF FOOT, RIGHT, INITIAL ENCOUNTER: Primary | ICD-10-CM

## 2023-07-15 PROCEDURE — 99213 OFFICE O/P EST LOW 20 MIN: CPT | Performed by: FAMILY MEDICINE

## 2023-07-15 RX ORDER — LEVOFLOXACIN 750 MG/1
750 TABLET, FILM COATED ORAL DAILY
Qty: 10 TABLET | Refills: 0 | Status: SHIPPED | OUTPATIENT
Start: 2023-07-15 | End: 2023-07-25

## 2023-07-15 RX ORDER — CEPHALEXIN 500 MG/1
500 CAPSULE ORAL 3 TIMES DAILY
Qty: 30 CAPSULE | Refills: 0 | Status: SHIPPED | OUTPATIENT
Start: 2023-07-15 | End: 2023-07-25

## 2023-07-15 NOTE — PROGRESS NOTES
Clinical Decision Making:    At the end of the encounter, I discussed results, diagnosis, medications. Discussed red flags for immediate return to clinic/ER, as well as indications for follow up if no improvement. Patient understood and agreed to plan. Patient was stable for discharge.      ICD-10-CM    1. Infected puncture wound of plantar aspect of foot, right, initial encounter  S91.331A levofloxacin (LEVAQUIN) 750 MG tablet    L08.9 cephALEXin (KEFLEX) 500 MG capsule        Puncture wounds to the plantar surface of the right foot 4 days ago with increasing pain.  His tetanus is up-to-date from January 13, 2021.  Will need to cover with antibiotics covering both staph and Pseudomonas.  Cephalexin 500 mg 3 times daily for 10 days  Levofloxacin 750 mg daily for 10 days  Note done to be off of work for a couple of days as he stands on his feet all day.  Follow-up if not improving as anticipated  Patient and mom verbalized understanding      There are no Patient Instructions on file for this visit.   Return in about 3 days (around 7/18/2023), or if symptoms worsen or fail to improve.      chief complaint    HPI:  Akash Hernandez is a 23 year old male who presents today complaining of increasing pain in the bottom of his right foot after stepping on a nail Tuesday evening.  On July 11th, 4 days ago, patient stepped on a nail in the evening.  He was wearing crocs and they estimate the nail could have gone into his foot about an inch to an inch and a half deep.  The nail was completely removed and intact.  It was a nail that was inside a board outdoors.  They do have live animals outdoors including a pig.  The pain is gotten worse over the last couple of days.    History obtained from chart review, mother and the patient.    Problem List:  2022-05: Acute medial meniscal tear  2022-04: Allergic rhinitis  2022-04: Generalized anxiety disorder  2022-04: Migraine without aura and responsive to treatment  2022-04: Moderate  persistent asthma  2022-04: Obesity  2022-04: Primary insomnia  2022-04: Vitamin D deficiency  2022-04: Morbid obesity (H)      History reviewed. No pertinent past medical history.    Social History     Tobacco Use     Smoking status: Never     Smokeless tobacco: Never   Substance Use Topics     Alcohol use: Not on file       Review of systems  negative except listed in HPI    Vitals:    07/15/23 1249   BP: 117/78   Pulse: 67   Resp: 18   Temp: 97.7  F (36.5  C)   TempSrc: Tympanic   SpO2: 96%   Weight: 145.2 kg (320 lb)       Physical Exam  Vitals noted and within normal limits  In general he is alert, oriented, and in no acute distress  Right foot with normal cap refill and sensation intact distally  Plantar surface shows a healing puncture wound centrally on the plantar surface of the foot.  There is minimal amount of redness laterally but no obvious erythema, swelling or drainage.  Positive tenderness to palpation of the plantar surface of the foot.

## 2023-07-15 NOTE — LETTER
July 15, 2023      Akash Hernandez  [address]   [place] [address]        To Whom It May Concern:    Akash Hernandez  was seen on 07/15/23.  Please excuse him until Tuesday, July 18, 2023 due to injury.        Sincerely,        Maribell Burch MD

## 2023-07-15 NOTE — PATIENT INSTRUCTIONS
Acetaminophen (Tylenol) 500mg tablets.  You may take 2 tabs every 4-6 hours as needed  Ibuprofen (Advil, Motrin) 200mg tablets.  You may take 3 tabs every 6-8 hours as needed with food.    Soak in warm water with epsom salts once daily

## 2023-09-05 DIAGNOSIS — G43.009 MIGRAINE WITHOUT AURA AND RESPONSIVE TO TREATMENT: ICD-10-CM

## 2023-09-05 NOTE — TELEPHONE ENCOUNTER
"Routing refill request to provider for review/approval because:  Labs not current:  Multiple    Last Written Prescription Date:  2/22/23  Last Fill Quantity: 180,  # refills: 1   Last office visit provider:   4/11/23 with Kobe    Requested Prescriptions   Pending Prescriptions Disp Refills    topiramate (TOPAMAX) 100 MG tablet [Pharmacy Med Name: TOPIRAMATE 100MG TABS] 180 tablet 1     Sig: TAKE ONE TABLET BY MOUTH TWICE A DAY       Anti-Seizure Meds Protocol  Failed - 9/5/2023  2:40 PM        Failed - Review Authorizing provider's last note.      Refer to last progress notes: confirm request is for original authorizing provider (cannot be through other providers).          Failed - Normal CBC on file in past 26 months     No lab results found.              Failed - Normal ALT or AST on file in past 26 months     No lab results found.  No lab results found.          Failed - Normal platelet count on file in past 26 months     No lab results found.            Passed - Recent (12 mo) or future (30 days) visit within the authorizing provider's specialty     Patient has had an office visit with the authorizing provider or a provider within the authorizing providers department within the previous 12 mos or has a future within next 30 days. See \"Patient Info\" tab in inbasket, or \"Choose Columns\" in Meds & Orders section of the refill encounter.              Passed - Medication is active on med list             ARLINE PLUMMER RN 09/05/23 2:40 PM  "

## 2023-09-06 RX ORDER — TOPIRAMATE 100 MG/1
100 TABLET, FILM COATED ORAL 2 TIMES DAILY
Qty: 180 TABLET | Refills: 1 | Status: SHIPPED | OUTPATIENT
Start: 2023-09-06

## 2023-09-24 ENCOUNTER — OFFICE VISIT (OUTPATIENT)
Dept: URGENT CARE | Facility: URGENT CARE | Age: 23
End: 2023-09-24
Payer: COMMERCIAL

## 2023-09-24 ENCOUNTER — ANCILLARY PROCEDURE (OUTPATIENT)
Dept: GENERAL RADIOLOGY | Facility: CLINIC | Age: 23
End: 2023-09-24
Attending: PHYSICIAN ASSISTANT
Payer: COMMERCIAL

## 2023-09-24 VITALS
TEMPERATURE: 97.8 F | WEIGHT: 315 LBS | DIASTOLIC BLOOD PRESSURE: 73 MMHG | OXYGEN SATURATION: 99 % | SYSTOLIC BLOOD PRESSURE: 107 MMHG | RESPIRATION RATE: 18 BRPM | HEART RATE: 75 BPM | BODY MASS INDEX: 48.55 KG/M2

## 2023-09-24 DIAGNOSIS — S93.491A SPRAIN OF ANTERIOR TALOFIBULAR LIGAMENT OF RIGHT ANKLE, INITIAL ENCOUNTER: Primary | ICD-10-CM

## 2023-09-24 PROCEDURE — 73610 X-RAY EXAM OF ANKLE: CPT | Mod: TC | Performed by: RADIOLOGY

## 2023-09-24 PROCEDURE — 99213 OFFICE O/P EST LOW 20 MIN: CPT | Performed by: PHYSICIAN ASSISTANT

## 2023-09-24 NOTE — PATIENT INSTRUCTIONS
Ice topically to the area 20 minutes on each hour while awake.  Take precautions to avoid damage to the skin.  After 2 days, transition to ice topically 20 minutes 3 times per day.  After 2 days may add heat and alternate ice and heat.  Ibuprofen 600 mg (3 tablets) 3 times a day with food for analgesia and anti-inflammatory effect.  Do not use the ibuprofen if you have contraindications to using NSAIDs.  Injuries to the extremities may be elevated above level of the heart continuously for the first 2 days as much as possible.  Use of over-the-counter neoprene slip on sleeve that is well fitted but not too tight to cut off circulation.  Return to see primary care provider or return to clinic for reexamination and anatoliy-ray in 2 weeks if anything less than 100% resolution or return to pain-free weightbearing and full activities.

## 2023-09-24 NOTE — PROGRESS NOTES
Patient presents with:  Ankle Pain: Right ankle pain moving down foot 9/21/23 twisted it      Clinical Decision Making:  X-rays obtained and did not show acute fracture or dislocation.  Patient is treated for ankle sprain with activity modification, compression elevation and anti-inflammatory medications. Expected course of resolution and indication for return was gone over and questions were answered to patient/parent's satisfaction before discharge.        ICD-10-CM    1. Sprain of anterior talofibular ligament of right ankle, initial encounter  S93.491A XR Ankle Right G/E 3 Views          Patient Instructions   Ice topically to the area 20 minutes on each hour while awake.  Take precautions to avoid damage to the skin.  After 2 days, transition to ice topically 20 minutes 3 times per day.  After 2 days may add heat and alternate ice and heat.  Ibuprofen 600 mg (3 tablets) 3 times a day with food for analgesia and anti-inflammatory effect.  Do not use the ibuprofen if you have contraindications to using NSAIDs.  Injuries to the extremities may be elevated above level of the heart continuously for the first 2 days as much as possible.  Use of over-the-counter neoprene slip on sleeve that is well fitted but not too tight to cut off circulation.  Return to see primary care provider or return to clinic for reexamination and anatoliy-ray in 2 weeks if anything less than 100% resolution or return to pain-free weightbearing and full activities.      HPI:  Akash Hernandez is a 23 year old male who presents today for an inversion injury to the right ankle.  Patient states that he stepped down off of a stair and had twisted the right ankle on 9/21/2023.  He has not had foot knee or hip pain of the ipsilateral side or contralateral left lower extremity injury to report.    History obtained from chart review and the patient.    Problem List:  2022-05: Acute medial meniscal tear  2022-04: Allergic rhinitis  2022-04: Generalized  anxiety disorder  2022-04: Migraine without aura and responsive to treatment  2022-04: Moderate persistent asthma  2022-04: Obesity  2022-04: Primary insomnia  2022-04: Vitamin D deficiency  2022-04: Morbid obesity (H)      No past medical history on file.    Social History     Tobacco Use    Smoking status: Never    Smokeless tobacco: Never   Substance Use Topics    Alcohol use: Not on file       Review of Systems  As above in HPI otherwise negative.    Vitals:    09/24/23 1408   BP: 107/73   Pulse: 75   Resp: 18   Temp: 97.8  F (36.6  C)   TempSrc: Tympanic   SpO2: 99%   Weight: 147 kg (324 lb)     General: Patient is resting comfortably no acute distress is afebrile  HEENT: Head is normocephalic atraumatic   Skin: Without rash non-diaphoretic  Musculoskeletal:   Appearance: No noted erythema, ecchymoses skin rash or lesions, wound/scar deformity.  Lateral ankle swelling noted on the right lateral malleoli    Tenderness: Anterior and posterior talofibular ligaments are tender to palpation.    Range of motion:  Normal ankle range of motion: Dorsiflexion to 15 degrees, plantarflexion 45 degrees  Inversion/eversion are well-tolerated, ankles with full range of motion.    Instability:  Anterior drawer test is negative,    talar tilt test is reproduces pain for the patient, but has a good endpoint and stability    Motor: 5 out of 5 plantar flexion dorsiflexion inversion eversion    Sensory: Neurovascular exam is intact to light touch in all dermatomes of the foot and ankle.    Physical Exam    Labs:  Results for orders placed or performed in visit on 09/24/23   XR Ankle Right G/E 3 Views     Status: None    Narrative    EXAM: XR ANKLE RIGHT G/E 3 VIEWS  LOCATION: [facility] [place]  DATE: 9/24/2023    INDICATION: Pain after injury  COMPARISON: None.      Impression    IMPRESSION: The right ankle is negative for fracture or disruption of ankle mortise. No significant soft tissue swelling.        Radiology:  I have personally ordered and preliminarily reviewed the following xray, I have noted no fracture or joint effusion.    At the end of the encounter, I discussed results, diagnosis, medications. Discussed red flags for immediate return to clinic/ER, as well as indications for follow up if no improvement. Patient understood and agreed to plan. Patient was stable for discharge.

## 2023-11-21 ENCOUNTER — ANCILLARY PROCEDURE (OUTPATIENT)
Dept: GENERAL RADIOLOGY | Facility: CLINIC | Age: 23
End: 2023-11-21
Attending: PHYSICIAN ASSISTANT
Payer: COMMERCIAL

## 2023-11-21 ENCOUNTER — OFFICE VISIT (OUTPATIENT)
Dept: FAMILY MEDICINE | Facility: CLINIC | Age: 23
End: 2023-11-21
Payer: COMMERCIAL

## 2023-11-21 VITALS
OXYGEN SATURATION: 97 % | SYSTOLIC BLOOD PRESSURE: 118 MMHG | TEMPERATURE: 98.3 F | RESPIRATION RATE: 16 BRPM | WEIGHT: 315 LBS | HEIGHT: 69 IN | DIASTOLIC BLOOD PRESSURE: 81 MMHG | BODY MASS INDEX: 46.65 KG/M2 | HEART RATE: 62 BPM

## 2023-11-21 DIAGNOSIS — M25.532 LEFT WRIST PAIN: ICD-10-CM

## 2023-11-21 DIAGNOSIS — M25.532 LEFT WRIST PAIN: Primary | ICD-10-CM

## 2023-11-21 PROCEDURE — 90480 ADMN SARSCOV2 VAC 1/ONLY CMP: CPT | Performed by: PHYSICIAN ASSISTANT

## 2023-11-21 PROCEDURE — 90471 IMMUNIZATION ADMIN: CPT | Performed by: PHYSICIAN ASSISTANT

## 2023-11-21 PROCEDURE — 99213 OFFICE O/P EST LOW 20 MIN: CPT | Mod: 25 | Performed by: PHYSICIAN ASSISTANT

## 2023-11-21 PROCEDURE — 90686 IIV4 VACC NO PRSV 0.5 ML IM: CPT | Performed by: PHYSICIAN ASSISTANT

## 2023-11-21 PROCEDURE — 91320 SARSCV2 VAC 30MCG TRS-SUC IM: CPT | Performed by: PHYSICIAN ASSISTANT

## 2023-11-21 PROCEDURE — 73110 X-RAY EXAM OF WRIST: CPT | Mod: TC | Performed by: RADIOLOGY

## 2023-11-21 ASSESSMENT — ASTHMA QUESTIONNAIRES
QUESTION_1 LAST FOUR WEEKS HOW MUCH OF THE TIME DID YOUR ASTHMA KEEP YOU FROM GETTING AS MUCH DONE AT WORK, SCHOOL OR AT HOME: NONE OF THE TIME
ACT_TOTALSCORE: 25
QUESTION_2 LAST FOUR WEEKS HOW OFTEN HAVE YOU HAD SHORTNESS OF BREATH: NOT AT ALL
ACT_TOTALSCORE: 25
QUESTION_5 LAST FOUR WEEKS HOW WOULD YOU RATE YOUR ASTHMA CONTROL: COMPLETELY CONTROLLED
QUESTION_3 LAST FOUR WEEKS HOW OFTEN DID YOUR ASTHMA SYMPTOMS (WHEEZING, COUGHING, SHORTNESS OF BREATH, CHEST TIGHTNESS OR PAIN) WAKE YOU UP AT NIGHT OR EARLIER THAN USUAL IN THE MORNING: NOT AT ALL
QUESTION_4 LAST FOUR WEEKS HOW OFTEN HAVE YOU USED YOUR RESCUE INHALER OR NEBULIZER MEDICATION (SUCH AS ALBUTEROL): NOT AT ALL

## 2023-11-21 ASSESSMENT — ENCOUNTER SYMPTOMS
NUMBNESS: 1
ARTHRALGIAS: 1
JOINT SWELLING: 0
PARESTHESIAS: 0

## 2023-11-21 NOTE — PROGRESS NOTES
"  Assessment & Plan     Left wrist pain  Treat as sprain/strain ice NSAIDs continue to wear the brace if its not improved in 10 to 14 days or if it worsens prior he will recheck  - XR Wrist Left G/E 3 Views               BMI:   Estimated body mass index is 48.56 kg/m  as calculated from the following:    Height as of this encounter: 1.74 m (5' 8.5\").    Weight as of this encounter: 147 kg (324 lb 1.6 oz).         ELMA Triana  [facility]    Subjective   David is a 23 year old, presenting for the following health issues:  Musculoskeletal Problem (Left wrist pain x 10 days )        11/21/2023    10:46 AM   Additional Questions   Roomed by JUAN Montalvo       23-year-old here for left wrist pain patient states that about 10 days ago he was transporting some pigs to [place]  had his left hand rest on the armrest he noticed some pain in the elbow and then into the forearm short time thereafter he noticed a severe pain in the wrist he has been wearing a brace in the wrist he still has some tenderness at the elbow and in the wrist but less severe  He has had no paresthesias or numbness  Has not had prior similar  He is right-hand dominant    Musculoskeletal Problem  Associated symptoms include arthralgias and numbness. Pertinent negatives include no joint swelling.   History of Present Illness       Reason for visit:  Wrist/elbow injury  Symptom onset:  1-2 weeks ago  Symptoms include:  Pain in my wrist that sometimes radiates down to my elbow  Symptom intensity:  Moderate  Symptom progression:  Staying the same  Had these symptoms before:  No  What makes it worse:  Using my left arm to lift over 10-15 lbs or turning my wrist  What makes it better:  Laying my arm completely flat and resting it    He eats 2-3 servings of fruits and vegetables daily.He consumes 2 sweetened beverage(s) daily.He exercises with enough effort to increase his heart rate 20 to 29 minutes per day.  He exercises with " "enough effort to increase his heart rate 4 days per week.   He is taking medications regularly.                 Review of Systems   Musculoskeletal:  Positive for arthralgias. Negative for joint swelling.   Neurological:  Positive for numbness. Negative for paresthesias.            Objective    /81 (BP Location: Right arm, Patient Position: Sitting, Cuff Size: Adult Large)   Pulse 62   Temp 98.3  F (36.8  C) (Oral)   Resp 16   Ht 1.74 m (5' 8.5\")   Wt 147 kg (324 lb 1.6 oz)   SpO2 97%   BMI 48.56 kg/m    Body mass index is 48.56 kg/m .  Physical Exam is some mild tenderness over the ulnar groove no tenderness over the medial lateral epicondylar structures some tenderness in the forearm flexors at the wrist volar and dorsiflexion of the wrist causes some discomfort diffusely around the wrist itself  He has intact radial pulse  Capillary refill is brisk less than 2 seconds  Intact sensation to light touch      Results for orders placed or performed in visit on 11/21/23   XR Wrist Left G/E 3 Views     Status: None    Narrative    EXAM: XR WRIST LEFT G/E 3 VIEWS  LOCATION: [facility]  DATE: 11/21/2023    INDICATION:  Left wrist pain  COMPARISON: None.      Impression    IMPRESSION: Normal joint spaces and alignment. No fracture.                       "

## 2024-05-31 ENCOUNTER — TRANSFERRED RECORDS (OUTPATIENT)
Dept: HEALTH INFORMATION MANAGEMENT | Facility: CLINIC | Age: 24
End: 2024-05-31
Payer: COMMERCIAL

## 2024-07-27 ENCOUNTER — HEALTH MAINTENANCE LETTER (OUTPATIENT)
Age: 24
End: 2024-07-27

## 2024-08-31 ENCOUNTER — NURSE TRIAGE (OUTPATIENT)
Dept: NURSING | Facility: CLINIC | Age: 24
End: 2024-08-31
Payer: COMMERCIAL

## 2024-08-31 NOTE — TELEPHONE ENCOUNTER
Tested positive for covid last night.  Moderate difficulty breathing.  Per the protocol, I recommended that he be seen now in an ER  Instructed too to wear a mask and let them know he tested positive for covid.   Caller stated understanding and agreement.      Reason for Disposition   MODERATE difficulty breathing (e.g., speaks in phrases, SOB even at rest, pulse 100-120)    Additional Information   Negative: SEVERE difficulty breathing (e.g., struggling for each breath, speaks in single words)   Negative: Difficult to awaken or acting confused (e.g., disoriented, slurred speech)   Negative: Bluish (or gray) lips or face now   Negative: Shock suspected (e.g., cold/pale/clammy skin, too weak to stand, low BP, rapid pulse)   Negative: Sounds like a life-threatening emergency to the triager   Negative: [1] Diagnosed or suspected COVID-19 AND [2] symptoms lasting 3 or more weeks   Negative: [1] COVID-19 exposure AND [2] no symptoms   Negative: COVID-19 vaccine reaction suspected (e.g., fever, headache, muscle aches) occurring 1 to 3 days after getting vaccine   Negative: COVID-19 vaccine, questions about   Negative: [1] Lives with someone known to have influenza (flu test positive) AND [2] flu-like symptoms (e.g., cough, runny nose, sore throat, SOB; with or without fever)   Negative: [1] Possible COVID-19 symptoms AND [2] triager concerned about severity of symptoms or other causes   Negative: COVID-19 and breastfeeding, questions about   Negative: SEVERE or constant chest pain or pressure  (Exception: Mild central chest pain, present only when coughing.)    Protocols used: Coronavirus (COVID-19) Diagnosed or [id number]  Kathy G, RN [facility]

## 2025-08-10 ENCOUNTER — HEALTH MAINTENANCE LETTER (OUTPATIENT)
Age: 25
End: 2025-08-10